# Patient Record
Sex: FEMALE | Race: BLACK OR AFRICAN AMERICAN | Employment: UNEMPLOYED | URBAN - METROPOLITAN AREA
[De-identification: names, ages, dates, MRNs, and addresses within clinical notes are randomized per-mention and may not be internally consistent; named-entity substitution may affect disease eponyms.]

---

## 2017-01-15 ENCOUNTER — HOSPITAL ENCOUNTER (INPATIENT)
Age: 67
LOS: 5 days | Discharge: HOME | DRG: 336 | End: 2017-01-21
Attending: EMERGENCY MEDICINE | Admitting: SURGERY
Payer: MEDICARE

## 2017-01-15 DIAGNOSIS — K43.6 VENTRAL HERNIA WITH OBSTRUCTION: ICD-10-CM

## 2017-01-15 DIAGNOSIS — K42.0 INCARCERATED UMBILICAL HERNIA: Primary | ICD-10-CM

## 2017-01-15 LAB
ABO + RH BLD: NORMAL
ALBUMIN SERPL BCP-MCNC: 3.1 G/DL (ref 3.5–5)
ALBUMIN/GLOB SERPL: 0.8 {RATIO} (ref 1.1–2.2)
ALP SERPL-CCNC: 78 U/L (ref 45–117)
ALT SERPL-CCNC: 17 U/L (ref 12–78)
ANION GAP BLD CALC-SCNC: 8 MMOL/L (ref 5–15)
APTT PPP: 24 SEC (ref 22.1–32.5)
AST SERPL W P-5'-P-CCNC: 13 U/L (ref 15–37)
BASOPHILS # BLD AUTO: 0 K/UL (ref 0–0.1)
BASOPHILS # BLD: 0 % (ref 0–1)
BILIRUB SERPL-MCNC: 0.8 MG/DL (ref 0.2–1)
BLOOD GROUP ANTIBODIES SERPL: NORMAL
BUN SERPL-MCNC: 10 MG/DL (ref 6–20)
BUN/CREAT SERPL: 17 (ref 12–20)
CALCIUM SERPL-MCNC: 8.7 MG/DL (ref 8.5–10.1)
CHLORIDE SERPL-SCNC: 106 MMOL/L (ref 97–108)
CO2 SERPL-SCNC: 25 MMOL/L (ref 21–32)
CREAT SERPL-MCNC: 0.6 MG/DL (ref 0.55–1.02)
EOSINOPHIL # BLD: 0.2 K/UL (ref 0–0.4)
EOSINOPHIL NFR BLD: 2 % (ref 0–7)
ERYTHROCYTE [DISTWIDTH] IN BLOOD BY AUTOMATED COUNT: 13.3 % (ref 11.5–14.5)
GLOBULIN SER CALC-MCNC: 4.1 G/DL (ref 2–4)
GLUCOSE SERPL-MCNC: 83 MG/DL (ref 65–100)
HCT VFR BLD AUTO: 42.2 % (ref 35–47)
HGB BLD-MCNC: 13.5 G/DL (ref 11.5–16)
INR PPP: 1 (ref 0.9–1.1)
LACTATE SERPL-SCNC: 1.1 MMOL/L (ref 0.4–2)
LYMPHOCYTES # BLD AUTO: 32 % (ref 12–49)
LYMPHOCYTES # BLD: 3.3 K/UL (ref 0.8–3.5)
MCH RBC QN AUTO: 31 PG (ref 26–34)
MCHC RBC AUTO-ENTMCNC: 32 G/DL (ref 30–36.5)
MCV RBC AUTO: 96.8 FL (ref 80–99)
MONOCYTES # BLD: 0.7 K/UL (ref 0–1)
MONOCYTES NFR BLD AUTO: 7 % (ref 5–13)
NEUTS SEG # BLD: 6 K/UL (ref 1.8–8)
NEUTS SEG NFR BLD AUTO: 59 % (ref 32–75)
PLATELET # BLD AUTO: 270 K/UL (ref 150–400)
POTASSIUM SERPL-SCNC: 3.3 MMOL/L (ref 3.5–5.1)
PROT SERPL-MCNC: 7.2 G/DL (ref 6.4–8.2)
PROTHROMBIN TIME: 10.3 SEC (ref 9–11.1)
RBC # BLD AUTO: 4.36 M/UL (ref 3.8–5.2)
SODIUM SERPL-SCNC: 139 MMOL/L (ref 136–145)
SPECIMEN EXP DATE BLD: NORMAL
THERAPEUTIC RANGE,PTTT: NORMAL SECS (ref 58–77)
WBC # BLD AUTO: 10.3 K/UL (ref 3.6–11)

## 2017-01-15 PROCEDURE — 36415 COLL VENOUS BLD VENIPUNCTURE: CPT | Performed by: EMERGENCY MEDICINE

## 2017-01-15 PROCEDURE — 93005 ELECTROCARDIOGRAM TRACING: CPT

## 2017-01-15 PROCEDURE — 85730 THROMBOPLASTIN TIME PARTIAL: CPT | Performed by: EMERGENCY MEDICINE

## 2017-01-15 PROCEDURE — 83605 ASSAY OF LACTIC ACID: CPT | Performed by: EMERGENCY MEDICINE

## 2017-01-15 PROCEDURE — 85610 PROTHROMBIN TIME: CPT | Performed by: EMERGENCY MEDICINE

## 2017-01-15 PROCEDURE — 86900 BLOOD TYPING SEROLOGIC ABO: CPT | Performed by: EMERGENCY MEDICINE

## 2017-01-15 PROCEDURE — 99285 EMERGENCY DEPT VISIT HI MDM: CPT

## 2017-01-15 PROCEDURE — 85025 COMPLETE CBC W/AUTO DIFF WBC: CPT | Performed by: EMERGENCY MEDICINE

## 2017-01-15 PROCEDURE — 80053 COMPREHEN METABOLIC PANEL: CPT | Performed by: EMERGENCY MEDICINE

## 2017-01-15 NOTE — IP AVS SNAPSHOT
Current Discharge Medication List  
  
Take these medications at their scheduled times Dose & Instructions Dispensing Information Comments Morning Noon Evening Bedtime  
 docusate sodium 100 mg capsule Commonly known as:  Johann Pata Your next dose is: Today, Tomorrow Other:  ____________ Dose:  100 mg Take 1 Cap by mouth daily. Quantity:  30 Cap Refills:  0  
     
   
   
   
  
 hydrALAZINE 100 mg tablet Commonly known as:  APRESOLINE Your next dose is: Today, Tomorrow Other:  ____________ Dose:  50 mg Take 50 mg by mouth two (2) times a day. Refills:  0  
     
   
   
   
  
 hydroCHLOROthiazide 12.5 mg capsule Commonly known as:  Beck [place] Your next dose is: Today, Tomorrow Other:  ____________ Dose:  12.5 mg Take 12.5 mg by mouth daily. Refills:  0 MICARDIS 80 mg tablet Generic drug:  telmisartan Your next dose is: Today, Tomorrow Other:  ____________ Dose:  80 mg Take 80 mg by mouth daily. Refills:  0 Take these medications as needed Dose & Instructions Dispensing Information Comments Morning Noon Evening Bedtime HYDROmorphone 2 mg tablet Commonly known as:  DILAUDID Your next dose is: Today, Tomorrow Other:  ____________ Dose:  2 mg Take 1 Tab by mouth every four (4) hours as needed. Quantity:  40 Tab Refills:  0 Take these medications as directed Dose & Instructions Dispensing Information Comments Morning Noon Evening Bedtime  
 hydrocortisone 2.5 % topical cream  
Commonly known as:  HYTONE Your next dose is: Today, Tomorrow Other:  ____________ APPLY A THIN LAYER TOPICALLY  TO ABDOMEN  TWICE DAILY AS NEEDED Quantity:  30 oz Refills:  0  
     
   
   
   
  
 TYLENOL PO  
   
 Your next dose is: Today, Tomorrow Other:  ____________ Take  by mouth. Refills:  0 Where to Get Your Medications Information about where to get these medications is not yet available ! Ask your nurse or doctor about these medications  
  docusate sodium 100 mg capsule HYDROmorphone 2 mg tablet

## 2017-01-15 NOTE — IP AVS SNAPSHOT
Summary of Care Report The Summary of Care report has been created to help improve care coordination. Users with access to [email or web address] [address] (Web-based application) may access additional patient information including the Discharge Summary. If you are not currently a [address] user and need more information, please call the number listed below in the [place] [address] section and ask to be connected with Medical Records. Facility Information Name Address Phone [phone] [place] [phone] Patient Information Patient Name Sex  Dimitri Sample ([id number]) Female 1950 Discharge Information Admitting Provider Service Area Unit Leandro Kent MD / [address] [facility] [address] / [phone] Discharge Provider Discharge Date/Time Discharge Disposition Destination (none) 2017 Midday (Pending) AHR (none) Patient Language Language ENGLISH [13] Problem List as of 2017  Date Reviewed: 12/10/2013 Codes Priority Class Noted - Resolved RESOLVED: Small bowel obstruction ([facility] [address] ICD-10-CM: K56.69 
ICD-9-CM: 560.9   12/10/2013 - 2014 Ventral hernia with obstruction ICD-10-CM: K43.6 ICD-9-CM: 552.20   2014 - Present Small bowel obstruction ([facility] [address] ICD-10-CM: K56.69 
ICD-9-CM: 560.9   2017 - Present You are allergic to the following Allergen Reactions Aspirin Other (comments) Patient told not to take due to abdominal surgeries Current Discharge Medication List  
  
CONTINUE these medications which have NOT CHANGED Dose & Instructions Dispensing Information Comments  
 docusate sodium 100 mg capsule Commonly known as:  [place] Dose:  100 mg Take 1 Cap by mouth daily. Quantity:  30 Cap Refills:  0 hydrALAZINE 100 mg tablet Commonly known as:  APRESOLINE Dose:  50 mg Take 50 mg by mouth two (2) times a day. Refills:  0  
   
 hydroCHLOROthiazide 12.5 mg capsule Commonly known as:  [place] Dose:  12.5 mg Take 12.5 mg by mouth daily. Refills:  0  
   
 hydrocortisone 2.5 % topical cream  
Commonly known as:  HYTONE  
 APPLY A THIN LAYER TOPICALLY  TO ABDOMEN  TWICE DAILY AS NEEDED Quantity:  30 oz Refills:  0 HYDROmorphone 2 mg tablet Commonly known as:  DILAUDID Dose:  2 mg Take 1 Tab by mouth every four (4) hours as needed. Quantity:  40 Tab Refills:  0 MICARDIS 80 mg tablet Generic drug:  telmisartan Dose:  80 mg Take 80 mg by mouth daily. Refills:  0  
   
 TYLENOL PO Take  by mouth. Refills:  0 Surgery Information ID Date/Time Status Primary Surgeon All Procedures Location [id number] 1/16/2017 0100 Posted Cali Bradford MD LAPAROTOMY EXPLORATORY AND OPEN VENTRAL HERNIA REPAIR WITH MESH [address] [place] [address] OR Follow-up Information Follow up With Details Comments Contact Info Lucio Osorio MD   Patient can only remember the practice name and not the physician Discharge Instructions 1. Do not drive while on pain medication. You should take a stool softener while on pain medication to prevent constipation. 2.  Do not lift anything greater than 10 pounds for 4 weeks. 3.  You may resume your home medications. 4. You may shower but do not swim or soak in tub for 2 weeks. 5.  Please call [phone] to schedule a follow-up with Dr. Lucero Alvarez within 2 weeks. Abdominal Hernia Repair: What to Expect at Home Your Recovery After surgery to repair your hernia, you are likely to have pain for a few days. You may also feel like you have the flu, and you may have a low fever and feel tired and nauseated. This is common. You should feel better after a few days and will probably feel much better in 7 days. For several weeks you may feel twinges or pulling in the hernia repair when you move. You may have some bruising around the area of your hernia repair. This is normal. 
This care sheet gives you a general idea about how long it will take for you to recover. But each person recovers at a different pace. Follow the steps below to get better as quickly as possible. How can you care for yourself at home? Activity · Rest when you feel tired. Getting enough sleep will help you recover. · Try to walk each day. Start by walking a little more than you did the day before. Bit by bit, increase the amount you walk. Walking boosts blood flow and helps prevent pneumonia and constipation. · If your doctor gives you an abdominal binder to wear, use it as directed. This is an elastic bandage that wraps around your belly and upper hips. It helps support your belly muscles after surgery. · Avoid strenuous activities, such as biking, jogging, weight lifting, or aerobic exercise, until your doctor says it is okay. · Avoid lifting anything that would make you strain. This may include heavy grocery bags and milk containers, a heavy briefcase or backpack, cat litter or dog food bags, a vacuum , or a child. · Ask your doctor when you can drive again. · Most people are able to return to work within 1 to 2 weeks after surgery. But if your job requires that you to do heavy lifting or strenuous activity, you may need to take 4 to 6 weeks off from work. · You may shower 24 to 48 hours after surgery, if your doctor okays it. Pat the cut (incision) dry. Do not take a bath for the first 2 weeks, or until your doctor tells you it is okay. · Ask your doctor when it is okay for you to have sex. Diet · You can eat your normal diet. If your stomach is upset, try bland, low-fat foods like plain rice, broiled chicken, toast, and yogurt. · Drink plenty of fluids (unless your doctor tells you not to). · You may notice that your bowel movements are not regular right after your surgery. This is common. Avoid constipation and straining with bowel movements. You may want to take a fiber supplement every day. If you have not had a bowel movement after a couple of days, ask your doctor about taking a mild laxative. Medicines · Your doctor will tell you if and when you can restart your medicines. He or she will also give you instructions about taking any new medicines. · If you take blood thinners, such as warfarin (Coumadin), clopidogrel (Plavix), or aspirin, be sure to talk to your doctor. He or she will tell you if and when to start taking those medicines again. Make sure that you understand exactly what your doctor wants you to do. · Be safe with medicines. Take pain medicines exactly as directed. ¨ If the doctor gave you a prescription medicine for pain, take it as prescribed. ¨ If you are not taking a prescription pain medicine, ask your doctor if you can take an over-the-counter medicine. · If your doctor prescribed antibiotics, take them as directed. Do not stop taking them just because you feel better. You need to take the full course of antibiotics. · If you think your pain medicine is making you sick to your stomach: 
¨ Take your medicine after meals (unless your doctor has told you not to). ¨ Ask your doctor for a different pain medicine. Incision care · If you have strips of tape on the cut (incision) the doctor made, leave the tape on for a week or until it falls off. Or follow your doctor's instructions for removing the tape. · If you have staples closing the cut, you will need to visit your doctor in 1 to 2 weeks to have them removed. · Wash the area daily with warm, soapy water, and pat it dry. Don't use hydrogen peroxide or alcohol, which can slow healing. You may cover the area with a gauze bandage if it weeps or rubs against clothing. Change the bandage every day. Other instructions · Hold a pillow over your incision when you cough or take deep breaths. This will support your belly and decrease your pain. · Do breathing exercises at home as instructed by your doctor. This will help prevent pneumonia. · If you had laparoscopic surgery, you may also have pain in your left shoulder. The pain usually lasts about a day or two. Follow-up care is a key part of your treatment and safety. Be sure to make and go to all appointments, and call your doctor if you are having problems. It's also a good idea to know your test results and keep a list of the medicines you take. When should you call for help? Call 911 anytime you think you may need emergency care. For example, call if: 
· You passed out (lost consciousness). · You have sudden chest pain and shortness of breath, or you cough up blood. · You have severe pain in your belly. Call your doctor now or seek immediate medical care if: 
· You are sick to your stomach and cannot keep fluids down. · You have signs of a blood clot, such as: 
¨ Pain in your calf, back of the knee, thigh, or groin. ¨ Redness and swelling in your leg or groin. · You have signs of infection, such as: 
¨ Increased pain, swelling, warmth, or redness. ¨ Red streaks leading from the incision. ¨ Pus draining from the incision. ¨ A fever. · You have trouble passing urine or stool, especially if you have mild pain or swelling in your lower belly. · Bright red blood has soaked through the bandage over your incision. Watch closely for changes in your health, and be sure to contact your doctor if: 
· Your swelling is getting worse. · Your swelling is not going down. · You still don't have a bowel movement after taking a laxative. Where can you learn more? Go to http://kerri-mary.info/. Enter B577 in the search box to learn more about \"Abdominal Hernia Repair: What to Expect at Home. \" Current as of: August 9, 2016 Content Version: 11.1 © 4201-7015 Healthwise, Incorporated. Care instructions adapted under license by [employer] (which disclaims liability or warranty for this information). If you have questions about a medical condition or this instruction, always ask your healthcare professional. [place] [address] any warranty or liability for your use of this information. Chart Review Routing History No Routing History on File

## 2017-01-15 NOTE — IP AVS SNAPSHOT
[address] 
[phone] Patient: Geovani Brunson MRN: [id number] CQV:[id number] You are allergic to the following Allergen Reactions Aspirin Other (comments) Patient told not to take due to abdominal surgeries Recent Documentation Height Weight BMI OB Status Smoking Status 1.626 m 149.7 kg 56.64 kg/m2 Postmenopausal Never Smoker Unresulted Labs Order Current Status SAMPLE TO BLOOD BANK In process Emergency Contacts Name Discharge Info Relation Home Work Mobile Sridhar Castano  Spouse [3] [phone] [address] [facility]  Child [2] [phone] About your hospitalization You were admitted on:  January 16, 2017 You last received care in the:  [address] You were discharged on:  January 21, 2017 Unit phone number:  [phone] Why you were hospitalized Your primary diagnosis was:  Not on File Your diagnoses also included:  Small Bowel Obstruction (Hcc) Providers Seen During Your Hospitalizations Provider Role Specialty Primary office phone Clinton Spear MD Attending Provider Emergency Medicine [phone] Traci Denney MD Attending Provider General Surgery [phone] Your Primary Care Physician (PCP) Primary Care Physician Office Phone Office Fax OTHER, PHYS ** None ** ** None ** Follow-up Information Follow up With Details Comments Contact Info Lucio Osorio MD   Patient can only remember the practice name and not the physician Current Discharge Medication List  
  
CONTINUE these medications which have NOT CHANGED Dose & Instructions Dispensing Information Comments Morning Noon Evening Bedtime  
 docusate sodium 100 mg capsule Commonly known as:  Rola Carvalhos Your next dose is: Today, Tomorrow Other:  _________  Dose:  100 mg  
 Take 1 Cap by mouth daily. Quantity:  30 Cap Refills:  0  
     
   
   
   
  
 hydrALAZINE 100 mg tablet Commonly known as:  APRESOLINE Your next dose is: Today, Tomorrow Other:  _________ Dose:  50 mg Take 50 mg by mouth two (2) times a day. Refills:  0  
     
   
   
   
  
 hydroCHLOROthiazide 12.5 mg capsule Commonly known as:  Alvia Boss Your next dose is: Today, Tomorrow Other:  _________ Dose:  12.5 mg Take 12.5 mg by mouth daily. Refills:  0  
     
   
   
   
  
 hydrocortisone 2.5 % topical cream  
Commonly known as:  HYTONE Your next dose is: Today, Tomorrow Other:  _________ APPLY A THIN LAYER TOPICALLY  TO ABDOMEN  TWICE DAILY AS NEEDED Quantity:  30 oz Refills:  0 HYDROmorphone 2 mg tablet Commonly known as:  DILAUDID Your next dose is: Today, Tomorrow Other:  _________ Dose:  2 mg Take 1 Tab by mouth every four (4) hours as needed. Quantity:  40 Tab Refills:  0 MICARDIS 80 mg tablet Generic drug:  telmisartan Your next dose is: Today, Tomorrow Other:  _________ Dose:  80 mg Take 80 mg by mouth daily. Refills:  0  
     
   
   
   
  
 TYLENOL PO Your next dose is: Today, Tomorrow Other:  _________ Take  by mouth. Refills:  0 Where to Get Your Medications Information on where to get these meds will be given to you by the nurse or doctor. ! Ask your nurse or doctor about these medications  
  docusate sodium 100 mg capsule HYDROmorphone 2 mg tablet Discharge Instructions 1. Do not drive while on pain medication. You should take a stool softener while on pain medication to prevent constipation. 2.  Do not lift anything greater than 10 pounds for 4 weeks. 3.  You may resume your home medications. 4. You may shower but do not swim or soak in tub for 2 weeks. 5.  Please call [phone] to schedule a follow-up with Dr. Catalina Holloway within 2 weeks. Abdominal Hernia Repair: What to Expect at Home Your Recovery After surgery to repair your hernia, you are likely to have pain for a few days. You may also feel like you have the flu, and you may have a low fever and feel tired and nauseated. This is common. You should feel better after a few days and will probably feel much better in 7 days. For several weeks you may feel twinges or pulling in the hernia repair when you move. You may have some bruising around the area of your hernia repair. This is normal. 
This care sheet gives you a general idea about how long it will take for you to recover. But each person recovers at a different pace. Follow the steps below to get better as quickly as possible. How can you care for yourself at home? Activity · Rest when you feel tired. Getting enough sleep will help you recover. · Try to walk each day. Start by walking a little more than you did the day before. Bit by bit, increase the amount you walk. Walking boosts blood flow and helps prevent pneumonia and constipation. · If your doctor gives you an abdominal binder to wear, use it as directed. This is an elastic bandage that wraps around your belly and upper hips. It helps support your belly muscles after surgery. · Avoid strenuous activities, such as biking, jogging, weight lifting, or aerobic exercise, until your doctor says it is okay. · Avoid lifting anything that would make you strain. This may include heavy grocery bags and milk containers, a heavy briefcase or backpack, cat litter or dog food bags, a vacuum , or a child. · Ask your doctor when you can drive again. · Most people are able to return to work within 1 to 2 weeks after surgery.  But if your job requires that you to do heavy lifting or strenuous activity, you may need to take 4 to 6 weeks off from work. · You may shower 24 to 48 hours after surgery, if your doctor okays it. Pat the cut (incision) dry. Do not take a bath for the first 2 weeks, or until your doctor tells you it is okay. · Ask your doctor when it is okay for you to have sex. Diet · You can eat your normal diet. If your stomach is upset, try bland, low-fat foods like plain rice, broiled chicken, toast, and yogurt. · Drink plenty of fluids (unless your doctor tells you not to). · You may notice that your bowel movements are not regular right after your surgery. This is common. Avoid constipation and straining with bowel movements. You may want to take a fiber supplement every day. If you have not had a bowel movement after a couple of days, ask your doctor about taking a mild laxative. Medicines · Your doctor will tell you if and when you can restart your medicines. He or she will also give you instructions about taking any new medicines. · If you take blood thinners, such as warfarin (Coumadin), clopidogrel (Plavix), or aspirin, be sure to talk to your doctor. He or she will tell you if and when to start taking those medicines again. Make sure that you understand exactly what your doctor wants you to do. · Be safe with medicines. Take pain medicines exactly as directed. ¨ If the doctor gave you a prescription medicine for pain, take it as prescribed. ¨ If you are not taking a prescription pain medicine, ask your doctor if you can take an over-the-counter medicine. · If your doctor prescribed antibiotics, take them as directed. Do not stop taking them just because you feel better. You need to take the full course of antibiotics. · If you think your pain medicine is making you sick to your stomach: 
¨ Take your medicine after meals (unless your doctor has told you not to). ¨ Ask your doctor for a different pain medicine. Incision care · If you have strips of tape on the cut (incision) the doctor made, leave the tape on for a week or until it falls off. Or follow your doctor's instructions for removing the tape. · If you have staples closing the cut, you will need to visit your doctor in 1 to 2 weeks to have them removed. · Wash the area daily with warm, soapy water, and pat it dry. Don't use hydrogen peroxide or alcohol, which can slow healing. You may cover the area with a gauze bandage if it weeps or rubs against clothing. Change the bandage every day. Other instructions · Hold a pillow over your incision when you cough or take deep breaths. This will support your belly and decrease your pain. · Do breathing exercises at home as instructed by your doctor. This will help prevent pneumonia. · If you had laparoscopic surgery, you may also have pain in your left shoulder. The pain usually lasts about a day or two. Follow-up care is a key part of your treatment and safety. Be sure to make and go to all appointments, and call your doctor if you are having problems. It's also a good idea to know your test results and keep a list of the medicines you take. When should you call for help? Call 911 anytime you think you may need emergency care. For example, call if: 
· You passed out (lost consciousness). · You have sudden chest pain and shortness of breath, or you cough up blood. · You have severe pain in your belly. Call your doctor now or seek immediate medical care if: 
· You are sick to your stomach and cannot keep fluids down. · You have signs of a blood clot, such as: 
¨ Pain in your calf, back of the knee, thigh, or groin. ¨ Redness and swelling in your leg or groin. · You have signs of infection, such as: 
¨ Increased pain, swelling, warmth, or redness. ¨ Red streaks leading from the incision. ¨ Pus draining from the incision. ¨ A fever.  
· You have trouble passing urine or stool, especially if you have mild pain or swelling in your lower belly. · Bright red blood has soaked through the bandage over your incision. Watch closely for changes in your health, and be sure to contact your doctor if: 
· Your swelling is getting worse. · Your swelling is not going down. · You still don't have a bowel movement after taking a laxative. Where can you learn more? Go to http://kerri-mary.info/. Enter B577 in the search box to learn more about \"Abdominal Hernia Repair: What to Expect at Home. \" Current as of: August 9, 2016 Content Version: 11.1 © 5002-9188 [employer]. Care instructions adapted under license by [employer] (which disclaims liability or warranty for this information). If you have questions about a medical condition or this instruction, always ask your healthcare professional. [address] any warranty or liability for your use of this information. Discharge Orders None Introducing [facility]! Barbara Garcia introduces Social[employer] patient portal. Now you can access parts of your medical record, email your doctor's office, and request medication refills online. 1. In your internet browser, go to https://[email or web address]. [employer] 2. Click on the First Time User? Click Here link in the Sign In box. You will see the New Member Sign Up page. 3. Enter your SocialF5 Access Code exactly as it appears below. You will not need to use this code after youve completed the sign-up process. If you do not sign up before the expiration date, you must request a new code. · SocialF5 Access Code: [email or web address] Expires: 4/15/2017 10:45 PM 
 
4. Enter the last four digits of your Social Security Number (xxxx) and Date of Birth (mm/dd/yyyy) as indicated and click Submit. You will be taken to the next sign-up page. 5. Create a SocialF5 ID.  This will be your SocialF5 login ID and cannot be changed, so think of one that is secure and easy to remember. 6. Create a [employer] password. You can change your password at any time. 7. Enter your Password Reset Question and Answer. This can be used at a later time if you forget your password. 8. Enter your e-mail address. You will receive e-mail notification when new information is available in [address] 9. Click Sign Up. You can now view and download portions of your medical record. 10. Click the Download Summary menu link to download a portable copy of your medical information. If you have questions, please visit the Frequently Asked Questions section of the [employer] website. Remember, [employer] is NOT to be used for urgent needs. For medical emergencies, dial 911. Now available from your iPhone and Android! General Information Please provide this summary of care documentation to your next provider. Patient Signature:  ____________________________________________________________ Date:  ____________________________________________________________  
  
[place] Provider Signature:  ____________________________________________________________ Date:  ____________________________________________________________

## 2017-01-16 ENCOUNTER — ANESTHESIA (OUTPATIENT)
Dept: SURGERY | Age: 67
DRG: 336 | End: 2017-01-16
Payer: MEDICARE

## 2017-01-16 ENCOUNTER — ANESTHESIA EVENT (OUTPATIENT)
Dept: SURGERY | Age: 67
DRG: 336 | End: 2017-01-16
Payer: MEDICARE

## 2017-01-16 PROBLEM — K56.609 SMALL BOWEL OBSTRUCTION (HCC): Status: ACTIVE | Noted: 2017-01-16

## 2017-01-16 LAB
ATRIAL RATE: 57 BPM
CALCULATED P AXIS, ECG09: 88 DEGREES
CALCULATED R AXIS, ECG10: -10 DEGREES
CALCULATED T AXIS, ECG11: 27 DEGREES
DIAGNOSIS, 93000: NORMAL
P-R INTERVAL, ECG05: 196 MS
Q-T INTERVAL, ECG07: 418 MS
QRS DURATION, ECG06: 84 MS
QTC CALCULATION (BEZET), ECG08: 406 MS
VENTRICULAR RATE, ECG03: 57 BPM

## 2017-01-16 PROCEDURE — 77030032490 HC SLV COMPR SCD KNE COVD -B: Performed by: SURGERY

## 2017-01-16 PROCEDURE — 0DN80ZZ RELEASE SMALL INTESTINE, OPEN APPROACH: ICD-10-PCS | Performed by: SURGERY

## 2017-01-16 PROCEDURE — 74011250636 HC RX REV CODE- 250/636

## 2017-01-16 PROCEDURE — C1781 MESH (IMPLANTABLE): HCPCS | Performed by: SURGERY

## 2017-01-16 PROCEDURE — 77030013079 HC BLNKT BAIR HGGR 3M -A: Performed by: ANESTHESIOLOGY

## 2017-01-16 PROCEDURE — 77030002895 HC DEV VASC CLOSR COVD -B: Performed by: SURGERY

## 2017-01-16 PROCEDURE — 0WUF0JZ SUPPLEMENT ABDOMINAL WALL WITH SYNTHETIC SUBSTITUTE, OPEN APPROACH: ICD-10-PCS | Performed by: SURGERY

## 2017-01-16 PROCEDURE — 88302 TISSUE EXAM BY PATHOLOGIST: CPT | Performed by: SURGERY

## 2017-01-16 PROCEDURE — 76060000041 HC ANESTHESIA 5 TO 5.5 HR: Performed by: SURGERY

## 2017-01-16 PROCEDURE — 74011000250 HC RX REV CODE- 250: Performed by: SURGERY

## 2017-01-16 PROCEDURE — 77030018846 HC SOL IRR STRL H20 ICUM -A: Performed by: SURGERY

## 2017-01-16 PROCEDURE — 77030008467 HC STPLR SKN COVD -B: Performed by: SURGERY

## 2017-01-16 PROCEDURE — 77030034850: Performed by: SURGERY

## 2017-01-16 PROCEDURE — 74011000258 HC RX REV CODE- 258: Performed by: SURGERY

## 2017-01-16 PROCEDURE — 77030027138 HC INCENT SPIROMETER -A

## 2017-01-16 PROCEDURE — 76010000137 HC OR TIME 5 TO 5.5 HR: Performed by: SURGERY

## 2017-01-16 PROCEDURE — 77030008684 HC TU ET CUF COVD -B: Performed by: ANESTHESIOLOGY

## 2017-01-16 PROCEDURE — 77030031139 HC SUT VCRL2 J&J -A: Performed by: SURGERY

## 2017-01-16 PROCEDURE — 77030020061 HC IV BLD WRMR ADMIN SET 3M -B: Performed by: ANESTHESIOLOGY

## 2017-01-16 PROCEDURE — 77030011640 HC PAD GRND REM COVD -A: Performed by: SURGERY

## 2017-01-16 PROCEDURE — C9113 INJ PANTOPRAZOLE SODIUM, VIA: HCPCS | Performed by: SURGERY

## 2017-01-16 PROCEDURE — 74011250636 HC RX REV CODE- 250/636: Performed by: SURGERY

## 2017-01-16 PROCEDURE — 65210000002 HC RM PRIVATE GYN

## 2017-01-16 PROCEDURE — 77030032435: Performed by: SURGERY

## 2017-01-16 PROCEDURE — 74011000250 HC RX REV CODE- 250

## 2017-01-16 PROCEDURE — 77030026438 HC STYL ET INTUB CARD -A: Performed by: ANESTHESIOLOGY

## 2017-01-16 PROCEDURE — 76210000016 HC OR PH I REC 1 TO 1.5 HR: Performed by: SURGERY

## 2017-01-16 PROCEDURE — 77030002986 HC SUT PROL J&J -A: Performed by: SURGERY

## 2017-01-16 PROCEDURE — 77030008771 HC TU NG SALEM SUMP -A: Performed by: ANESTHESIOLOGY

## 2017-01-16 PROCEDURE — 74011250637 HC RX REV CODE- 250/637: Performed by: SURGERY

## 2017-01-16 PROCEDURE — 77030002966 HC SUT PDS J&J -A: Performed by: SURGERY

## 2017-01-16 PROCEDURE — 77030018836 HC SOL IRR NACL ICUM -A: Performed by: SURGERY

## 2017-01-16 DEVICE — IMPLANTABLE DEVICE: Type: IMPLANTABLE DEVICE | Site: ABDOMEN | Status: FUNCTIONAL

## 2017-01-16 RX ORDER — ONDANSETRON 2 MG/ML
4 INJECTION INTRAMUSCULAR; INTRAVENOUS AS NEEDED
Status: DISCONTINUED | OUTPATIENT
Start: 2017-01-16 | End: 2017-01-16 | Stop reason: HOSPADM

## 2017-01-16 RX ORDER — FENTANYL CITRATE 50 UG/ML
INJECTION, SOLUTION INTRAMUSCULAR; INTRAVENOUS AS NEEDED
Status: DISCONTINUED | OUTPATIENT
Start: 2017-01-16 | End: 2017-01-16 | Stop reason: HOSPADM

## 2017-01-16 RX ORDER — SODIUM CHLORIDE, SODIUM LACTATE, POTASSIUM CHLORIDE, CALCIUM CHLORIDE 600; 310; 30; 20 MG/100ML; MG/100ML; MG/100ML; MG/100ML
125 INJECTION, SOLUTION INTRAVENOUS CONTINUOUS
Status: DISCONTINUED | OUTPATIENT
Start: 2017-01-16 | End: 2017-01-16 | Stop reason: HOSPADM

## 2017-01-16 RX ORDER — NEOSTIGMINE METHYLSULFATE 1 MG/ML
INJECTION INTRAVENOUS AS NEEDED
Status: DISCONTINUED | OUTPATIENT
Start: 2017-01-16 | End: 2017-01-16 | Stop reason: HOSPADM

## 2017-01-16 RX ORDER — MORPHINE SULFATE 10 MG/ML
2 INJECTION, SOLUTION INTRAMUSCULAR; INTRAVENOUS
Status: DISCONTINUED | OUTPATIENT
Start: 2017-01-16 | End: 2017-01-16 | Stop reason: HOSPADM

## 2017-01-16 RX ORDER — SODIUM CHLORIDE, SODIUM LACTATE, POTASSIUM CHLORIDE, CALCIUM CHLORIDE 600; 310; 30; 20 MG/100ML; MG/100ML; MG/100ML; MG/100ML
INJECTION, SOLUTION INTRAVENOUS
Status: DISCONTINUED | OUTPATIENT
Start: 2017-01-16 | End: 2017-01-16 | Stop reason: HOSPADM

## 2017-01-16 RX ORDER — HYDROMORPHONE HYDROCHLORIDE 2 MG/ML
INJECTION, SOLUTION INTRAMUSCULAR; INTRAVENOUS; SUBCUTANEOUS AS NEEDED
Status: DISCONTINUED | OUTPATIENT
Start: 2017-01-16 | End: 2017-01-16 | Stop reason: HOSPADM

## 2017-01-16 RX ORDER — MIDAZOLAM HYDROCHLORIDE 1 MG/ML
0.5 INJECTION, SOLUTION INTRAMUSCULAR; INTRAVENOUS
Status: DISCONTINUED | OUTPATIENT
Start: 2017-01-16 | End: 2017-01-16 | Stop reason: HOSPADM

## 2017-01-16 RX ORDER — ROCURONIUM BROMIDE 10 MG/ML
INJECTION, SOLUTION INTRAVENOUS AS NEEDED
Status: DISCONTINUED | OUTPATIENT
Start: 2017-01-16 | End: 2017-01-16 | Stop reason: HOSPADM

## 2017-01-16 RX ORDER — HYDROMORPHONE HCL IN 0.9% NACL 15 MG/30ML
PATIENT CONTROLLED ANALGESIA VIAL INTRAVENOUS
Status: COMPLETED
Start: 2017-01-16 | End: 2017-01-16

## 2017-01-16 RX ORDER — DOCUSATE SODIUM 100 MG/1
100 CAPSULE, LIQUID FILLED ORAL 2 TIMES DAILY
Status: DISCONTINUED | OUTPATIENT
Start: 2017-01-16 | End: 2017-01-21 | Stop reason: HOSPADM

## 2017-01-16 RX ORDER — PROCHLORPERAZINE EDISYLATE 5 MG/ML
5 INJECTION INTRAMUSCULAR; INTRAVENOUS
Status: DISCONTINUED | OUTPATIENT
Start: 2017-01-16 | End: 2017-01-16 | Stop reason: CLARIF

## 2017-01-16 RX ORDER — SODIUM CHLORIDE, SODIUM LACTATE, POTASSIUM CHLORIDE, CALCIUM CHLORIDE 600; 310; 30; 20 MG/100ML; MG/100ML; MG/100ML; MG/100ML
50 INJECTION, SOLUTION INTRAVENOUS CONTINUOUS
Status: DISCONTINUED | OUTPATIENT
Start: 2017-01-16 | End: 2017-01-21 | Stop reason: HOSPADM

## 2017-01-16 RX ORDER — HYDROMORPHONE HCL IN 0.9% NACL 15 MG/30ML
PATIENT CONTROLLED ANALGESIA VIAL INTRAVENOUS CONTINUOUS
Status: DISCONTINUED | OUTPATIENT
Start: 2017-01-16 | End: 2017-01-19

## 2017-01-16 RX ORDER — SODIUM CHLORIDE, SODIUM LACTATE, POTASSIUM CHLORIDE, CALCIUM CHLORIDE 600; 310; 30; 20 MG/100ML; MG/100ML; MG/100ML; MG/100ML
75 INJECTION, SOLUTION INTRAVENOUS CONTINUOUS
Status: DISCONTINUED | OUTPATIENT
Start: 2017-01-16 | End: 2017-01-16 | Stop reason: HOSPADM

## 2017-01-16 RX ORDER — MIDAZOLAM HYDROCHLORIDE 1 MG/ML
INJECTION, SOLUTION INTRAMUSCULAR; INTRAVENOUS AS NEEDED
Status: DISCONTINUED | OUTPATIENT
Start: 2017-01-16 | End: 2017-01-16 | Stop reason: HOSPADM

## 2017-01-16 RX ORDER — PANTOPRAZOLE SODIUM 40 MG/10ML
INJECTION, POWDER, LYOPHILIZED, FOR SOLUTION INTRAVENOUS
Status: DISPENSED
Start: 2017-01-16 | End: 2017-01-16

## 2017-01-16 RX ORDER — SODIUM CHLORIDE 9 MG/ML
25 INJECTION, SOLUTION INTRAVENOUS CONTINUOUS
Status: DISCONTINUED | OUTPATIENT
Start: 2017-01-16 | End: 2017-01-16 | Stop reason: HOSPADM

## 2017-01-16 RX ORDER — KETAMINE HYDROCHLORIDE 10 MG/ML
INJECTION, SOLUTION INTRAMUSCULAR; INTRAVENOUS AS NEEDED
Status: DISCONTINUED | OUTPATIENT
Start: 2017-01-16 | End: 2017-01-16 | Stop reason: HOSPADM

## 2017-01-16 RX ORDER — SODIUM CHLORIDE 0.9 % (FLUSH) 0.9 %
5-10 SYRINGE (ML) INJECTION AS NEEDED
Status: DISCONTINUED | OUTPATIENT
Start: 2017-01-16 | End: 2017-01-16 | Stop reason: HOSPADM

## 2017-01-16 RX ORDER — HYDROCODONE BITARTRATE AND ACETAMINOPHEN 5; 325 MG/1; MG/1
1 TABLET ORAL AS NEEDED
Status: DISCONTINUED | OUTPATIENT
Start: 2017-01-16 | End: 2017-01-16 | Stop reason: HOSPADM

## 2017-01-16 RX ORDER — PANTOPRAZOLE SODIUM 40 MG/10ML
40 INJECTION, POWDER, LYOPHILIZED, FOR SOLUTION INTRAVENOUS EVERY 24 HOURS
Status: DISCONTINUED | OUTPATIENT
Start: 2017-01-16 | End: 2017-01-16 | Stop reason: CLARIF

## 2017-01-16 RX ORDER — SODIUM CHLORIDE 9 MG/ML
50 INJECTION, SOLUTION INTRAVENOUS CONTINUOUS
Status: DISCONTINUED | OUTPATIENT
Start: 2017-01-16 | End: 2017-01-16 | Stop reason: HOSPADM

## 2017-01-16 RX ORDER — HYDROMORPHONE HYDROCHLORIDE 1 MG/ML
0.2 INJECTION, SOLUTION INTRAMUSCULAR; INTRAVENOUS; SUBCUTANEOUS
Status: DISCONTINUED | OUTPATIENT
Start: 2017-01-16 | End: 2017-01-16 | Stop reason: HOSPADM

## 2017-01-16 RX ORDER — MIDAZOLAM HYDROCHLORIDE 1 MG/ML
1 INJECTION, SOLUTION INTRAMUSCULAR; INTRAVENOUS AS NEEDED
Status: DISCONTINUED | OUTPATIENT
Start: 2017-01-16 | End: 2017-01-16 | Stop reason: HOSPADM

## 2017-01-16 RX ORDER — PROPOFOL 10 MG/ML
INJECTION, EMULSION INTRAVENOUS AS NEEDED
Status: DISCONTINUED | OUTPATIENT
Start: 2017-01-16 | End: 2017-01-16 | Stop reason: HOSPADM

## 2017-01-16 RX ORDER — PIPERACILLIN SODIUM, TAZOBACTAM SODIUM 3; .375 G/15ML; G/15ML
INJECTION, POWDER, LYOPHILIZED, FOR SOLUTION INTRAVENOUS
Status: COMPLETED
Start: 2017-01-16 | End: 2017-01-16

## 2017-01-16 RX ORDER — SODIUM CHLORIDE 900 MG/100ML
INJECTION INTRAVENOUS
Status: DISPENSED
Start: 2017-01-16 | End: 2017-01-16

## 2017-01-16 RX ORDER — ONDANSETRON 2 MG/ML
INJECTION INTRAMUSCULAR; INTRAVENOUS AS NEEDED
Status: DISCONTINUED | OUTPATIENT
Start: 2017-01-16 | End: 2017-01-16 | Stop reason: HOSPADM

## 2017-01-16 RX ORDER — ACETAMINOPHEN 325 MG/1
650 TABLET ORAL
Status: DISCONTINUED | OUTPATIENT
Start: 2017-01-16 | End: 2017-01-21 | Stop reason: HOSPADM

## 2017-01-16 RX ORDER — HYDROCHLOROTHIAZIDE 25 MG/1
12.5 TABLET ORAL DAILY
Status: DISCONTINUED | OUTPATIENT
Start: 2017-01-16 | End: 2017-01-21 | Stop reason: HOSPADM

## 2017-01-16 RX ORDER — HYDRALAZINE HYDROCHLORIDE 25 MG/1
50 TABLET, FILM COATED ORAL 2 TIMES DAILY
Status: DISCONTINUED | OUTPATIENT
Start: 2017-01-16 | End: 2017-01-19

## 2017-01-16 RX ORDER — ONDANSETRON 2 MG/ML
4 INJECTION INTRAMUSCULAR; INTRAVENOUS
Status: DISCONTINUED | OUTPATIENT
Start: 2017-01-16 | End: 2017-01-21 | Stop reason: HOSPADM

## 2017-01-16 RX ORDER — SUCCINYLCHOLINE CHLORIDE 20 MG/ML
INJECTION INTRAMUSCULAR; INTRAVENOUS AS NEEDED
Status: DISCONTINUED | OUTPATIENT
Start: 2017-01-16 | End: 2017-01-16 | Stop reason: HOSPADM

## 2017-01-16 RX ORDER — DIPHENHYDRAMINE HYDROCHLORIDE 50 MG/ML
12.5 INJECTION, SOLUTION INTRAMUSCULAR; INTRAVENOUS AS NEEDED
Status: DISCONTINUED | OUTPATIENT
Start: 2017-01-16 | End: 2017-01-16 | Stop reason: HOSPADM

## 2017-01-16 RX ORDER — FENTANYL CITRATE 50 UG/ML
25 INJECTION, SOLUTION INTRAMUSCULAR; INTRAVENOUS
Status: DISCONTINUED | OUTPATIENT
Start: 2017-01-16 | End: 2017-01-16 | Stop reason: HOSPADM

## 2017-01-16 RX ORDER — FENTANYL CITRATE 50 UG/ML
50 INJECTION, SOLUTION INTRAMUSCULAR; INTRAVENOUS AS NEEDED
Status: DISCONTINUED | OUTPATIENT
Start: 2017-01-16 | End: 2017-01-16 | Stop reason: HOSPADM

## 2017-01-16 RX ORDER — LIDOCAINE HYDROCHLORIDE 20 MG/ML
INJECTION, SOLUTION EPIDURAL; INFILTRATION; INTRACAUDAL; PERINEURAL AS NEEDED
Status: DISCONTINUED | OUTPATIENT
Start: 2017-01-16 | End: 2017-01-16 | Stop reason: HOSPADM

## 2017-01-16 RX ORDER — HYDROMORPHONE HYDROCHLORIDE 1 MG/ML
1 INJECTION, SOLUTION INTRAMUSCULAR; INTRAVENOUS; SUBCUTANEOUS
Status: DISCONTINUED | OUTPATIENT
Start: 2017-01-16 | End: 2017-01-21 | Stop reason: HOSPADM

## 2017-01-16 RX ORDER — TELMISARTAN 80 MG/1
80 TABLET ORAL DAILY
Status: DISCONTINUED | OUTPATIENT
Start: 2017-01-16 | End: 2017-01-16 | Stop reason: CLARIF

## 2017-01-16 RX ORDER — LOSARTAN POTASSIUM 50 MG/1
100 TABLET ORAL DAILY
Status: DISCONTINUED | OUTPATIENT
Start: 2017-01-16 | End: 2017-01-21 | Stop reason: HOSPADM

## 2017-01-16 RX ORDER — LIDOCAINE HYDROCHLORIDE 10 MG/ML
0.1 INJECTION, SOLUTION EPIDURAL; INFILTRATION; INTRACAUDAL; PERINEURAL AS NEEDED
Status: DISCONTINUED | OUTPATIENT
Start: 2017-01-16 | End: 2017-01-16 | Stop reason: HOSPADM

## 2017-01-16 RX ORDER — GLYCOPYRROLATE 0.2 MG/ML
INJECTION INTRAMUSCULAR; INTRAVENOUS AS NEEDED
Status: DISCONTINUED | OUTPATIENT
Start: 2017-01-16 | End: 2017-01-16 | Stop reason: HOSPADM

## 2017-01-16 RX ORDER — SODIUM CHLORIDE 0.9 % (FLUSH) 0.9 %
5-10 SYRINGE (ML) INJECTION EVERY 8 HOURS
Status: DISCONTINUED | OUTPATIENT
Start: 2017-01-16 | End: 2017-01-16 | Stop reason: HOSPADM

## 2017-01-16 RX ADMIN — PROPOFOL 160 MG: 10 INJECTION, EMULSION INTRAVENOUS at 01:01

## 2017-01-16 RX ADMIN — SODIUM CHLORIDE 40 MG: 9 INJECTION INTRAMUSCULAR; INTRAVENOUS; SUBCUTANEOUS at 07:13

## 2017-01-16 RX ADMIN — GLYCOPYRROLATE 0.6 MG: 0.2 INJECTION INTRAMUSCULAR; INTRAVENOUS at 05:54

## 2017-01-16 RX ADMIN — HYDROMORPHONE HYDROCHLORIDE 0.5 MG: 2 INJECTION, SOLUTION INTRAMUSCULAR; INTRAVENOUS; SUBCUTANEOUS at 05:56

## 2017-01-16 RX ADMIN — HYDROMORPHONE HYDROCHLORIDE 0.4 MG: 2 INJECTION, SOLUTION INTRAMUSCULAR; INTRAVENOUS; SUBCUTANEOUS at 06:21

## 2017-01-16 RX ADMIN — ROCURONIUM BROMIDE 35 MG: 10 INJECTION, SOLUTION INTRAVENOUS at 01:11

## 2017-01-16 RX ADMIN — ROCURONIUM BROMIDE 10 MG: 10 INJECTION, SOLUTION INTRAVENOUS at 03:30

## 2017-01-16 RX ADMIN — KETAMINE HYDROCHLORIDE 10 MG: 10 INJECTION, SOLUTION INTRAMUSCULAR; INTRAVENOUS at 04:44

## 2017-01-16 RX ADMIN — PROPOFOL 60 MG: 10 INJECTION, EMULSION INTRAVENOUS at 05:24

## 2017-01-16 RX ADMIN — Medication: at 07:26

## 2017-01-16 RX ADMIN — SODIUM CHLORIDE, SODIUM LACTATE, POTASSIUM CHLORIDE, CALCIUM CHLORIDE: 600; 310; 30; 20 INJECTION, SOLUTION INTRAVENOUS at 02:30

## 2017-01-16 RX ADMIN — SUCCINYLCHOLINE CHLORIDE 200 MG: 20 INJECTION INTRAMUSCULAR; INTRAVENOUS at 01:01

## 2017-01-16 RX ADMIN — HYDROMORPHONE HYDROCHLORIDE 0.8 MG: 2 INJECTION, SOLUTION INTRAMUSCULAR; INTRAVENOUS; SUBCUTANEOUS at 00:50

## 2017-01-16 RX ADMIN — HYDROMORPHONE HYDROCHLORIDE 0.4 MG: 2 INJECTION, SOLUTION INTRAMUSCULAR; INTRAVENOUS; SUBCUTANEOUS at 04:08

## 2017-01-16 RX ADMIN — ROCURONIUM BROMIDE 10 MG: 10 INJECTION, SOLUTION INTRAVENOUS at 02:07

## 2017-01-16 RX ADMIN — KETAMINE HYDROCHLORIDE 10 MG: 10 INJECTION, SOLUTION INTRAMUSCULAR; INTRAVENOUS at 03:30

## 2017-01-16 RX ADMIN — PIPERACILLIN SODIUM,TAZOBACTAM SODIUM 3.38 G: 3; .375 INJECTION, POWDER, FOR SOLUTION INTRAVENOUS at 07:19

## 2017-01-16 RX ADMIN — KETAMINE HYDROCHLORIDE 10 MG: 10 INJECTION, SOLUTION INTRAMUSCULAR; INTRAVENOUS at 01:57

## 2017-01-16 RX ADMIN — PROPOFOL 40 MG: 10 INJECTION, EMULSION INTRAVENOUS at 04:07

## 2017-01-16 RX ADMIN — HYDRALAZINE HYDROCHLORIDE 50 MG: 25 TABLET, FILM COATED ORAL at 18:22

## 2017-01-16 RX ADMIN — SODIUM CHLORIDE, SODIUM LACTATE, POTASSIUM CHLORIDE, AND CALCIUM CHLORIDE 125 ML/HR: 600; 310; 30; 20 INJECTION, SOLUTION INTRAVENOUS at 18:22

## 2017-01-16 RX ADMIN — FENTANYL CITRATE 100 MCG: 50 INJECTION, SOLUTION INTRAMUSCULAR; INTRAVENOUS at 01:01

## 2017-01-16 RX ADMIN — ROCURONIUM BROMIDE 10 MG: 10 INJECTION, SOLUTION INTRAVENOUS at 04:08

## 2017-01-16 RX ADMIN — HYDROMORPHONE HYDROCHLORIDE 0.4 MG: 2 INJECTION, SOLUTION INTRAMUSCULAR; INTRAVENOUS; SUBCUTANEOUS at 02:05

## 2017-01-16 RX ADMIN — LIDOCAINE HYDROCHLORIDE 60 MG: 20 INJECTION, SOLUTION EPIDURAL; INFILTRATION; INTRACAUDAL; PERINEURAL at 01:01

## 2017-01-16 RX ADMIN — MIDAZOLAM HYDROCHLORIDE 2 MG: 1 INJECTION, SOLUTION INTRAMUSCULAR; INTRAVENOUS at 00:50

## 2017-01-16 RX ADMIN — ROCURONIUM BROMIDE 10 MG: 10 INJECTION, SOLUTION INTRAVENOUS at 04:57

## 2017-01-16 RX ADMIN — SODIUM CHLORIDE, SODIUM LACTATE, POTASSIUM CHLORIDE, CALCIUM CHLORIDE: 600; 310; 30; 20 INJECTION, SOLUTION INTRAVENOUS at 05:26

## 2017-01-16 RX ADMIN — ROCURONIUM BROMIDE 5 MG: 10 INJECTION, SOLUTION INTRAVENOUS at 01:01

## 2017-01-16 RX ADMIN — HYDROMORPHONE HYDROCHLORIDE 0.5 MG: 2 INJECTION, SOLUTION INTRAMUSCULAR; INTRAVENOUS; SUBCUTANEOUS at 06:00

## 2017-01-16 RX ADMIN — ONDANSETRON 4 MG: 2 INJECTION INTRAMUSCULAR; INTRAVENOUS at 05:16

## 2017-01-16 RX ADMIN — PIPERACILLIN SODIUM,TAZOBACTAM SODIUM 3.38 G: 3; .375 INJECTION, POWDER, FOR SOLUTION INTRAVENOUS at 22:37

## 2017-01-16 RX ADMIN — NEOSTIGMINE METHYLSULFATE 3 MG: 1 INJECTION INTRAVENOUS at 05:54

## 2017-01-16 RX ADMIN — ROCURONIUM BROMIDE 10 MG: 10 INJECTION, SOLUTION INTRAVENOUS at 03:00

## 2017-01-16 RX ADMIN — KETAMINE HYDROCHLORIDE 30 MG: 10 INJECTION, SOLUTION INTRAMUSCULAR; INTRAVENOUS at 01:00

## 2017-01-16 RX ADMIN — PIPERACILLIN SODIUM,TAZOBACTAM SODIUM 3.38 G: 3; .375 INJECTION, POWDER, FOR SOLUTION INTRAVENOUS at 01:10

## 2017-01-16 RX ADMIN — DOCUSATE SODIUM 100 MG: 100 CAPSULE, LIQUID FILLED ORAL at 18:22

## 2017-01-16 RX ADMIN — SODIUM CHLORIDE, SODIUM LACTATE, POTASSIUM CHLORIDE, CALCIUM CHLORIDE: 600; 310; 30; 20 INJECTION, SOLUTION INTRAVENOUS at 00:50

## 2017-01-16 RX ADMIN — HYDROMORPHONE HYDROCHLORIDE 0.4 MG: 2 INJECTION, SOLUTION INTRAMUSCULAR; INTRAVENOUS; SUBCUTANEOUS at 05:23

## 2017-01-16 RX ADMIN — PROPOFOL 40 MG: 10 INJECTION, EMULSION INTRAVENOUS at 05:44

## 2017-01-16 RX ADMIN — GUAIFENESIN 600 MG: 600 TABLET, EXTENDED RELEASE ORAL at 22:37

## 2017-01-16 RX ADMIN — SODIUM CHLORIDE, SODIUM LACTATE, POTASSIUM CHLORIDE, AND CALCIUM CHLORIDE 125 ML/HR: 600; 310; 30; 20 INJECTION, SOLUTION INTRAVENOUS at 10:10

## 2017-01-16 RX ADMIN — KETAMINE HYDROCHLORIDE 10 MG: 10 INJECTION, SOLUTION INTRAMUSCULAR; INTRAVENOUS at 02:42

## 2017-01-16 RX ADMIN — ROCURONIUM BROMIDE 10 MG: 10 INJECTION, SOLUTION INTRAVENOUS at 02:02

## 2017-01-16 NOTE — PROGRESS NOTES
Progress Note    Patient: Miguel Villegas MRN: [id number]  SSN: [id number]    YOB: 1950  Age: 61 y.o. Sex: female      Admit Date: 1/15/2017    Day of Surgery    Procedure:  Procedure(s):  LAPAROTOMY EXPLORATORY AND OPEN VENTRAL HERNIA REPAIR WITH MESH    Subjective:     No acute surgical issues. Pt reported some abdominal tenderness. Pt also reported some sorethroat - most likely from intubation.     Objective:     Visit Vitals    /75 (BP 1 Location: Right arm, BP Patient Position: At rest)    Pulse 78    Temp 98.6 °F (37 °C)    Resp 18    Ht 5' 4\" (1.626 m)    Wt 330 lb (149.7 kg)    SpO2 96%    BMI 56.64 kg/m2       Temp (24hrs), Av.8 °F (37.1 °C), Min:97.9 °F (36.6 °C), Max:99.9 °F (37.7 °C)        Physical Exam:    Gen:  NAD  Pulm:  Unlabored  Abd:  S/ND/appropriate TTP  Wound:  C/D/I    Recent Results (from the past 24 hour(s))   EKG, 12 LEAD, INITIAL    Collection Time: 01/15/17 10:28 PM   Result Value Ref Range    Ventricular Rate 57 BPM    Atrial Rate 57 BPM    P-R Interval 196 ms    QRS Duration 84 ms    Q-T Interval 418 ms    QTC Calculation (Bezet) 406 ms    Calculated P Axis 88 degrees    Calculated R Axis -10 degrees    Calculated T Axis 27 degrees    Diagnosis       Sinus bradycardia with premature atrial complexes  Nonspecific T wave abnormality  When compared with ECG of 16-MAY-2014 15:06,  premature atrial complexes are now present    Confirmed by Kandis Dey M.D., Margo Saha ([id number]) on 2017 7:16:06 AM     CBC WITH AUTOMATED DIFF    Collection Time: 01/15/17 10:30 PM   Result Value Ref Range    WBC 10.3 3.6 - 11.0 K/uL    RBC 4.36 3.80 - 5.20 M/uL    HGB 13.5 11.5 - 16.0 g/dL    HCT 42.2 35.0 - 47.0 %    MCV 96.8 80.0 - 99.0 FL    MCH 31.0 26.0 - 34.0 PG    MCHC 32.0 30.0 - 36.5 g/dL    RDW 13.3 11.5 - 14.5 %    PLATELET 695 698 - 338 K/uL    NEUTROPHILS 59 32 - 75 %    LYMPHOCYTES 32 12 - 49 %    MONOCYTES 7 5 - 13 %    EOSINOPHILS 2 0 - 7 %    BASOPHILS 0 0 - 1 %    ABS. NEUTROPHILS 6.0 1.8 - 8.0 K/UL    ABS. LYMPHOCYTES 3.3 0.8 - 3.5 K/UL    ABS. MONOCYTES 0.7 0.0 - 1.0 K/UL    ABS. EOSINOPHILS 0.2 0.0 - 0.4 K/UL    ABS. BASOPHILS 0.0 0.0 - 0.1 K/UL   METABOLIC PANEL, COMPREHENSIVE    Collection Time: 01/15/17 10:30 PM   Result Value Ref Range    Sodium 139 136 - 145 mmol/L    Potassium 3.3 (L) 3.5 - 5.1 mmol/L    Chloride 106 97 - 108 mmol/L    CO2 25 21 - 32 mmol/L    Anion gap 8 5 - 15 mmol/L    Glucose 83 65 - 100 mg/dL    BUN 10 6 - 20 MG/DL    Creatinine 0.60 0.55 - 1.02 MG/DL    BUN/Creatinine ratio 17 12 - 20      GFR est AA >60 >60 ml/min/1.73m2    GFR est non-AA >60 >60 ml/min/1.73m2    Calcium 8.7 8.5 - 10.1 MG/DL    Bilirubin, total 0.8 0.2 - 1.0 MG/DL    ALT 17 12 - 78 U/L    AST 13 (L) 15 - 37 U/L    Alk.  phosphatase 78 45 - 117 U/L    Protein, total 7.2 6.4 - 8.2 g/dL    Albumin 3.1 (L) 3.5 - 5.0 g/dL    Globulin 4.1 (H) 2.0 - 4.0 g/dL    A-G Ratio 0.8 (L) 1.1 - 2.2     PROTHROMBIN TIME + INR    Collection Time: 01/15/17 10:30 PM   Result Value Ref Range    INR 1.0 0.9 - 1.1      Prothrombin time 10.3 9.0 - 11.1 sec   PTT    Collection Time: 01/15/17 10:30 PM   Result Value Ref Range    aPTT 24.0 22.1 - 32.5 sec    aPTT, therapeutic range     58.0 - 77.0 SECS   TYPE & SCREEN    Collection Time: 01/15/17 10:30 PM   Result Value Ref Range    Crossmatch Expiration 01/18/2017     ABO/Rh(D) A POSITIVE     Antibody screen NEG    LACTIC ACID, PLASMA    Collection Time: 01/15/17 10:48 PM   Result Value Ref Range    Lactic acid 1.1 0.4 - 2.0 MMOL/L         Assessment:     Hospital Problems  Date Reviewed: 12/10/2013          Codes Class Noted POA    Small bowel obstruction ([facility] [address] ICD-10-CM: K56.69  ICD-9-CM: 560.9  1/16/2017 Unknown              Plan/Recommendations/Medical Decision Making:     - NPO with sips of clears  - Abdominal binder  - Continue PCA  - Labs in am  - Mucinex for expecterant    Signed By: Aleksandr Ruiz MD     January 16, 2017

## 2017-01-16 NOTE — BRIEF OP NOTE
BRIEF OPERATIVE NOTE    Date of Procedure: 1/16/2017   Preoperative Diagnosis: INCARCERATED VENTRAL HERNIA  Postoperative Diagnosis: INCARCERATED VENTRAL HERNIA    Procedure(s):  LAPAROTOMY EXPLORATORY AND OPEN VENTRAL HERNIA REPAIR WITH MESH  Surgeon(s) and Role:     * Katarzyna Wilder MD - Primary            Surgical Staff:  Circ-1: Kamran Lemus RN  Scrub Tech-1: Dusty Fernandes  Surg Asst-1: Tayler Navarrete  Event Time In   Incision Start 0129   Incision Close 0558     Anesthesia: General   Estimated Blood Loss: Minimal  Specimens:   ID Type Source Tests Collected by Time Destination   1 : Hernia sac and mesh Fresh Abdomen  Katarzyna Wilder MD 1/16/2017 0334 Pathology      Findings: There were several herniae in the lower midline incision. There was a 4 cm hernia to left of umbilicus with small bowel causing incarceration and bowel obstruction. There was no evidence of bowel ischemia or necrosis. Complications: None  Implants:   Implant Name Type Inv.  Item Serial No.  Lot No. LRB No. Used Action   MESH OPEN SKIRT [id number] -- [employer] - [id number]   MESH OPEN SKIRT [id number] -- [employer]   [facility] [id number] N/A 1 Implanted

## 2017-01-16 NOTE — ED NOTES
Bedside shift change report given to [id number] Judy Styles RN (oncoming nurse) by Sandrine Whatley RN (offgoing nurse). Report included the following information SBAR, ED Summary, Intake/Output, MAR and Recent Results.

## 2017-01-16 NOTE — ED NOTES
Patient taken to OR. Patient instructed on CHG bath, but was picked up by OR before she was able to complete. Consent signed.

## 2017-01-16 NOTE — PERIOP NOTES
Received patient from OR via bed to ,VSS, in no distress. Abdomen with Midline incision ,Abdominal binder. HALLEY from midline abdomin. Dr. Golden Watson at bedside. Family updated,visited patient and went home,left patient personal CPAP with patient.

## 2017-01-16 NOTE — H&P
Chief Complaint:  Incarcerated ventral hernia causing small bowel obstruction    HPI:  60 yo woman with hx obstructive sleep apnea, incisional hernia repair, HTN and morbid obesity who was transferred from [place] for incarcerated incisional hernia with bowel obstruction. Pt reported waking up at 2:00 am with abdominal pain, nausea and vomiting. Pt did have BM this morning. Pain has been to left of umbilicus and pt reported intermittent crampy pain. Pt had CT scan performed which showed small bowel obstruction secondary to incisional hernia. Past Medical History   Diagnosis Date    Arthritis     Endocrine disease      Thyroid nodule    Hypertension     Ill-defined condition      Obesity    Obesity     MELQUIADES on CPAP        Past Surgical History   Procedure Laterality Date    Hx orthopaedic       RTKR    Hx gyn       tubal ligation    Hx other surgical  12-12-13     PSBO with ventral hernia repair by [facility]. [facility]    Hx hernia repair  5-19-14     open incisional hernia repair with underlay mesh and extensive  lysis of adhesions for 2 hours - [facility]- DR. Izquierdo       No current facility-administered medications on file prior to encounter. Current Outpatient Prescriptions on File Prior to Encounter   Medication Sig Dispense Refill    hydrocortisone (HYTONE) 2.5 % topical cream APPLY A THIN LAYER TOPICALLY  TO ABDOMEN  TWICE DAILY AS NEEDED 30 oz 0    ACETAMINOPHEN (TYLENOL PO) Take  by mouth.  docusate sodium (COLACE) 100 mg capsule Take 1 Cap by mouth daily. 30 Cap 2    HYDROmorphone (DILAUDID) 2 mg tablet Take 1 Tab by mouth every four (4) hours as needed. 40 Tab 0    telmisartan (MICARDIS) 80 mg tablet Take 80 mg by mouth daily.  hydrochlorothiazide (MICROZIDE) 12.5 mg capsule Take 12.5 mg by mouth daily.  hydrALAZINE (APRESOLINE) 100 mg tablet Take 50 mg by mouth two (2) times a day.          Allergies   Allergen Reactions    Aspirin Other (comments)     Patient told not to take due to abdominal surgeries         Review of Systems - General ROS: negative  Psychological ROS: negative  Respiratory ROS: negative  Cardiovascular ROS: negative  Gastrointestinal ROS: positive for - abdominal pain and nausea/vomiting    Visit Vitals    /51    Pulse (!) 58    Temp 98.1 °F (36.7 °C)    Resp 20    Ht 5' 4\" (1.626 m)    Wt 330 lb (149.7 kg)    SpO2 95%    BMI 56.64 kg/m2         Physical Exam:    Gen:  NAD  Pulm:  Unlabored  Abd:  S/moderately distended/moderate TTP at periumbilical area with unreducible hernia    Recent Results (from the past 24 hour(s))   EKG, 12 LEAD, INITIAL    Collection Time: 01/15/17 10:28 PM   Result Value Ref Range    Ventricular Rate 57 BPM    Atrial Rate 57 BPM    P-R Interval 196 ms    QRS Duration 84 ms    Q-T Interval 418 ms    QTC Calculation (Bezet) 406 ms    Calculated P Axis 88 degrees    Calculated R Axis -10 degrees    Calculated T Axis 27 degrees    Diagnosis       Sinus bradycardia with premature atrial complexes  Nonspecific T wave abnormality  When compared with ECG of 16-MAY-2014 15:06,  premature atrial complexes are now present  Nonspecific T wave abnormality now evident in Inferior leads  Nonspecific T wave abnormality, worse in Lateral leads     CBC WITH AUTOMATED DIFF    Collection Time: 01/15/17 10:30 PM   Result Value Ref Range    WBC 10.3 3.6 - 11.0 K/uL    RBC 4.36 3.80 - 5.20 M/uL    HGB 13.5 11.5 - 16.0 g/dL    HCT 42.2 35.0 - 47.0 %    MCV 96.8 80.0 - 99.0 FL    MCH 31.0 26.0 - 34.0 PG    MCHC 32.0 30.0 - 36.5 g/dL    RDW 13.3 11.5 - 14.5 %    PLATELET 410 228 - 774 K/uL    NEUTROPHILS 59 32 - 75 %    LYMPHOCYTES 32 12 - 49 %    MONOCYTES 7 5 - 13 %    EOSINOPHILS 2 0 - 7 %    BASOPHILS 0 0 - 1 %    ABS. NEUTROPHILS 6.0 1.8 - 8.0 K/UL    ABS. LYMPHOCYTES 3.3 0.8 - 3.5 K/UL    ABS. MONOCYTES 0.7 0.0 - 1.0 K/UL    ABS. EOSINOPHILS 0.2 0.0 - 0.4 K/UL    ABS.  BASOPHILS 0.0 0.0 - 0.1 K/UL   METABOLIC PANEL, COMPREHENSIVE Collection Time: 01/15/17 10:30 PM   Result Value Ref Range    Sodium 139 136 - 145 mmol/L    Potassium 3.3 (L) 3.5 - 5.1 mmol/L    Chloride 106 97 - 108 mmol/L    CO2 25 21 - 32 mmol/L    Anion gap 8 5 - 15 mmol/L    Glucose 83 65 - 100 mg/dL    BUN 10 6 - 20 MG/DL    Creatinine 0.60 0.55 - 1.02 MG/DL    BUN/Creatinine ratio 17 12 - 20      GFR est AA >60 >60 ml/min/1.73m2    GFR est non-AA >60 >60 ml/min/1.73m2    Calcium 8.7 8.5 - 10.1 MG/DL    Bilirubin, total 0.8 0.2 - 1.0 MG/DL    ALT 17 12 - 78 U/L    AST 13 (L) 15 - 37 U/L    Alk. phosphatase 78 45 - 117 U/L    Protein, total 7.2 6.4 - 8.2 g/dL    Albumin 3.1 (L) 3.5 - 5.0 g/dL    Globulin 4.1 (H) 2.0 - 4.0 g/dL    A-G Ratio 0.8 (L) 1.1 - 2.2     PROTHROMBIN TIME + INR    Collection Time: 01/15/17 10:30 PM   Result Value Ref Range    INR 1.0 0.9 - 1.1      Prothrombin time 10.3 9.0 - 11.1 sec   PTT    Collection Time: 01/15/17 10:30 PM   Result Value Ref Range    aPTT 24.0 22.1 - 32.5 sec    aPTT, therapeutic range     58.0 - 77.0 SECS   TYPE & SCREEN    Collection Time: 01/15/17 10:30 PM   Result Value Ref Range    Crossmatch Expiration 01/18/2017     ABO/Rh(D) A POSITIVE     Antibody screen NEG    LACTIC ACID, PLASMA    Collection Time: 01/15/17 10:48 PM   Result Value Ref Range    Lactic acid 1.1 0.4 - 2.0 MMOL/L       AP:  60 yo woman with incarcerated incisional hernia    - Incarcerated hernia:  To OR emergently for open incisional hernia repair with mesh  - risks and benefits explained. Consent obtained.   Proceed to OR

## 2017-01-16 NOTE — ANESTHESIA PREPROCEDURE EVALUATION
Anesthetic History   No history of anesthetic complications            Review of Systems / Medical History  Patient summary reviewed, nursing notes reviewed and pertinent labs reviewed    Pulmonary  Within defined limits      Sleep apnea           Neuro/Psych   Within defined limits           Cardiovascular  Within defined limits  Hypertension                   GI/Hepatic/Renal  Within defined limits              Endo/Other  Within defined limits      Obesity     Other Findings              Physical Exam    Airway  Mallampati: II  TM Distance: > 6 cm  Neck ROM: normal range of motion   Mouth opening: Normal     Cardiovascular  Regular rate and rhythm,  S1 and S2 normal,  no murmur, click, rub, or gallop             Dental  No notable dental hx       Pulmonary  Breath sounds clear to auscultation               Abdominal  GI exam deferred       Other Findings            Anesthetic Plan    ASA: 2  Anesthesia type: general          Induction: Intravenous  Anesthetic plan and risks discussed with: Patient

## 2017-01-16 NOTE — PROGRESS NOTES
Care Management Interventions  PCP Verified by CM: Yes (Dr. Sherryle Jamaica)  Mode of Transport at Discharge: Other (see comment) (private vehicle)  Discharge Durable Medical Equipment: No (Has Maico Prasad, and Bedside Commode at home)  Physical Therapy Consult: No  Occupational Therapy Consult: No  Speech Therapy Consult: No  Current Support Network: Lives with Spouse  Confirm Follow Up Transport: Family  Plan discussed with Pt/Family/Caregiver: No  Freedom of Choice Offered: Yes    CM reviewed chart. Pt lives with her  in [facility]. Pt's daughter lives in the [address] [place] [address] and is very supportive of patient. Pt has stayed with daughter after previous hospital stays and received home health services from [address] [place] [address]. Pt does have a cane, walker, and bedside commode at home. Pt receives Disability. Pt does have a PCP in [facility] - Dr. Sherryle Jamaica. Plan is to return home with family at time of discharge. CM will continue to follow for any needs that may arise.   Selin Smith, LUDIVINAW, ACM

## 2017-01-16 NOTE — ED PROVIDER NOTES
HPI Comments: 61 y.o. female with past medical history significant for obesity, HTN, PSBO, open incisional hernia repair who presents via EMS from ED in [place] with chief complaint of abdominal pain. Pt arrives as transfer from a [place] ER for SBO and incarcerated bowel. Pt states that she wanted to come to [facility] in [place], [place] because she has had previous abdominal surgery done here. Pt reports onset of constant, mid abdominal pain 20 hours ago (0200), in addition to nausea and vomiting. Pt states that she went to a hospital in [place], had pain and nausea medicine with relief and was transported to [facility] for further eval and possible surgery. She is uncertain if her abdomen is distended. Pt denies hx of heart murmur. There are no other acute medical concerns at this time. PCP: None    Note written by Berry Molina, as dictated by Eleazar Zheng MD 10:25 PM      The history is provided by the patient and the EMS personnel. Past Medical History:   Diagnosis Date    Arthritis     Endocrine disease      Thyroid nodule    Hypertension     Ill-defined condition      Obesity    Obesity     MELQUIADES on CPAP        Past Surgical History:   Procedure Laterality Date    Hx orthopaedic       RTKR    Hx gyn       tubal ligation    Hx other surgical  12-12-13     PSBO with ventral hernia repair by [facility]. [facility]    Hx hernia repair  5-19-14     open incisional hernia repair with underlay mesh and extensive  lysis of adhesions for 2 hours - [facility]- DR. Izquierdo         History reviewed. No pertinent family history. Social History     Social History    Marital status:      Spouse name: N/A    Number of children: N/A    Years of education: N/A     Occupational History    Not on file.      Social History Main Topics    Smoking status: Never Smoker    Smokeless tobacco: Never Used    Alcohol use No    Drug use: Not on file    Sexual activity: Not on file     Other Topics Concern    Not on file     Social History Narrative         ALLERGIES: Aspirin    Review of Systems   Gastrointestinal: Positive for abdominal pain, nausea and vomiting. All other systems reviewed and are negative. Vitals:    01/15/17 2216   BP: 156/70   Pulse: 63   Resp: 14   Temp: 97.9 °F (36.6 °C)   SpO2: 98%   Weight: 149.7 kg (330 lb)   Height: 5' 4\" (1.626 m)            Physical Exam   Constitutional: She appears well-developed and well-nourished. HENT:   Head: Normocephalic and atraumatic. Mouth/Throat: Oropharynx is clear and moist.   Eyes: EOM are normal. Pupils are equal, round, and reactive to light. Neck: Normal range of motion. Neck supple. Cardiovascular: Regular rhythm and intact distal pulses. Bradycardia present. Exam reveals no gallop and no friction rub. Murmur heard. Systolic murmur is present with a grade of 2/6   Pulse rate 55   Pulmonary/Chest: Effort normal. No respiratory distress. She has no wheezes. She has no rales. Abdominal: Soft. She exhibits mass (tender mass at left umbilicus). There is no tenderness. There is no rebound. Musculoskeletal: Normal range of motion. She exhibits no tenderness. Neurological: She is alert. No cranial nerve deficit. Motor; symmetric   Skin: No erythema. Psychiatric: She has a normal mood and affect. Her behavior is normal.   Nursing note and vitals reviewed. Note written by Berry Blanca, as dictated by Marvin Amor MD 10:25 PM      [facility]  ED Course       Procedures      PROGRESS NOTE:  10:31 PM  Will consult general surgery. Pt has medical records present with her from the [facility]. Dr. Edna Fonseca is aware of the pt. CONSULT NOTE:  10:35 PM Marvin Amor MD spoke with Dr. Marylene Gross, Consult for Surgery. He will evaluate the pt in the ED. ED EKG interpretation:  Rhythm: sinus bradycardia; and regular .  Rate (approx.): 55; Axis: normal; P wave: normal; QRS interval: normal ; ST/T wave: non-specific changes; in  Lead: ; Other findings: . This EKG was interpreted by Clitnon Spear MD,ED Provider.  10:37 PM

## 2017-01-16 NOTE — PERIOP NOTES
Darlin James Case was interviewed in pre-operative holding. She verbalized consent and understanding of the procedure. She verified her allergies and stated that she has metal implants in her left knee. She verified her npo status as prior to midnight 01/16/2017. She was educated on the intraoperative process and given the opportunity to ask questions, none unresolved.

## 2017-01-16 NOTE — PERIOP NOTES
TRANSFER - OUT REPORT:    Verbal report given to Shari(name) on Cherie Chino  being transferred to [facility](unit) for routine post - op       Report consisted of patients Situation, Background, Assessment and   Recommendations(SBAR). Time Pre op antibiotic given:0110  Anesthesia Stop time: 8997    Information from the following report(s) SBAR, Kardex, Procedure Summary, Intake/Output and MAR was reviewed with the receiving nurse. Opportunity for questions and clarification was provided. Is the patient on 02? YES       L/Min 2L       Other IV    Is the patient on a monitor? NO    Is the nurse transporting with the patient? NO    Surgical Waiting Area notified of patient's transfer from PACU?  YES      The following personal items collected during your admission accompanied patient upon transfer:   Dental Appliance: Dental Appliances: NoneNONE  Vision: Visual Aid:  (Readers)  Hearing Aid:    Jewelry:    Clothing: Clothing: None  Other Valuables:    Valuables sent to safe:

## 2017-01-16 NOTE — ED TRIAGE NOTES
Patient arrived via EMS as a transfer from [address] [place] [address]. Patient was diagnosed with a small bowel obstruction at the hospital in [place], but patient had previous hernia and abdominal surgeries performed by Dr. Morgan Holloway. Patient reports mid lower abdominal pain. Patient denies any nausea. Patient received Zosyn prior to discharge from previous hospital and it was completed en route. Patient had 4mg of morphine and 1L of NS.

## 2017-01-17 LAB
ALBUMIN SERPL BCP-MCNC: 2.5 G/DL (ref 3.5–5)
ALBUMIN/GLOB SERPL: 0.6 {RATIO} (ref 1.1–2.2)
ALP SERPL-CCNC: 65 U/L (ref 45–117)
ALT SERPL-CCNC: 13 U/L (ref 12–78)
ANION GAP BLD CALC-SCNC: 8 MMOL/L (ref 5–15)
AST SERPL W P-5'-P-CCNC: 11 U/L (ref 15–37)
BILIRUB SERPL-MCNC: 1.1 MG/DL (ref 0.2–1)
BUN SERPL-MCNC: 8 MG/DL (ref 6–20)
BUN/CREAT SERPL: 11 (ref 12–20)
CALCIUM SERPL-MCNC: 8.4 MG/DL (ref 8.5–10.1)
CHLORIDE SERPL-SCNC: 107 MMOL/L (ref 97–108)
CO2 SERPL-SCNC: 27 MMOL/L (ref 21–32)
CREAT SERPL-MCNC: 0.71 MG/DL (ref 0.55–1.02)
ERYTHROCYTE [DISTWIDTH] IN BLOOD BY AUTOMATED COUNT: 13.7 % (ref 11.5–14.5)
GLOBULIN SER CALC-MCNC: 4 G/DL (ref 2–4)
GLUCOSE SERPL-MCNC: 110 MG/DL (ref 65–100)
HCT VFR BLD AUTO: 39.7 % (ref 35–47)
HGB BLD-MCNC: 12.4 G/DL (ref 11.5–16)
MCH RBC QN AUTO: 30.8 PG (ref 26–34)
MCHC RBC AUTO-ENTMCNC: 31.2 G/DL (ref 30–36.5)
MCV RBC AUTO: 98.5 FL (ref 80–99)
PLATELET # BLD AUTO: 225 K/UL (ref 150–400)
POTASSIUM SERPL-SCNC: 3.5 MMOL/L (ref 3.5–5.1)
PROT SERPL-MCNC: 6.5 G/DL (ref 6.4–8.2)
RBC # BLD AUTO: 4.03 M/UL (ref 3.8–5.2)
SODIUM SERPL-SCNC: 142 MMOL/L (ref 136–145)
WBC # BLD AUTO: 12.6 K/UL (ref 3.6–11)

## 2017-01-17 PROCEDURE — 74011000250 HC RX REV CODE- 250: Performed by: SURGERY

## 2017-01-17 PROCEDURE — 80053 COMPREHEN METABOLIC PANEL: CPT | Performed by: SURGERY

## 2017-01-17 PROCEDURE — 85027 COMPLETE CBC AUTOMATED: CPT | Performed by: SURGERY

## 2017-01-17 PROCEDURE — 74011250637 HC RX REV CODE- 250/637: Performed by: SURGERY

## 2017-01-17 PROCEDURE — 36415 COLL VENOUS BLD VENIPUNCTURE: CPT | Performed by: SURGERY

## 2017-01-17 PROCEDURE — 65210000002 HC RM PRIVATE GYN

## 2017-01-17 PROCEDURE — 74011000258 HC RX REV CODE- 258: Performed by: SURGERY

## 2017-01-17 PROCEDURE — C9113 INJ PANTOPRAZOLE SODIUM, VIA: HCPCS | Performed by: SURGERY

## 2017-01-17 PROCEDURE — 74011250636 HC RX REV CODE- 250/636: Performed by: NURSE PRACTITIONER

## 2017-01-17 PROCEDURE — 74011250636 HC RX REV CODE- 250/636: Performed by: SURGERY

## 2017-01-17 RX ORDER — METOCLOPRAMIDE HYDROCHLORIDE 5 MG/ML
5 INJECTION INTRAMUSCULAR; INTRAVENOUS EVERY 6 HOURS
Status: DISCONTINUED | OUTPATIENT
Start: 2017-01-17 | End: 2017-01-18

## 2017-01-17 RX ORDER — ENOXAPARIN SODIUM 100 MG/ML
40 INJECTION SUBCUTANEOUS EVERY 24 HOURS
Status: DISCONTINUED | OUTPATIENT
Start: 2017-01-17 | End: 2017-01-21 | Stop reason: HOSPADM

## 2017-01-17 RX ADMIN — HYDRALAZINE HYDROCHLORIDE 50 MG: 25 TABLET, FILM COATED ORAL at 20:02

## 2017-01-17 RX ADMIN — GUAIFENESIN 600 MG: 600 TABLET, EXTENDED RELEASE ORAL at 22:11

## 2017-01-17 RX ADMIN — PIPERACILLIN SODIUM,TAZOBACTAM SODIUM 3.38 G: 3; .375 INJECTION, POWDER, FOR SOLUTION INTRAVENOUS at 06:58

## 2017-01-17 RX ADMIN — LOSARTAN POTASSIUM 100 MG: 50 TABLET ORAL at 10:46

## 2017-01-17 RX ADMIN — DOCUSATE SODIUM 100 MG: 100 CAPSULE, LIQUID FILLED ORAL at 20:03

## 2017-01-17 RX ADMIN — PIPERACILLIN SODIUM,TAZOBACTAM SODIUM 3.38 G: 3; .375 INJECTION, POWDER, FOR SOLUTION INTRAVENOUS at 15:26

## 2017-01-17 RX ADMIN — PIPERACILLIN SODIUM,TAZOBACTAM SODIUM 3.38 G: 3; .375 INJECTION, POWDER, FOR SOLUTION INTRAVENOUS at 22:12

## 2017-01-17 RX ADMIN — GUAIFENESIN 600 MG: 600 TABLET, EXTENDED RELEASE ORAL at 10:47

## 2017-01-17 RX ADMIN — METOCLOPRAMIDE 5 MG: 5 INJECTION, SOLUTION INTRAMUSCULAR; INTRAVENOUS at 20:03

## 2017-01-17 RX ADMIN — METOCLOPRAMIDE 5 MG: 5 INJECTION, SOLUTION INTRAMUSCULAR; INTRAVENOUS at 23:35

## 2017-01-17 RX ADMIN — METOCLOPRAMIDE 5 MG: 5 INJECTION, SOLUTION INTRAMUSCULAR; INTRAVENOUS at 12:48

## 2017-01-17 RX ADMIN — SODIUM CHLORIDE 40 MG: 9 INJECTION INTRAMUSCULAR; INTRAVENOUS; SUBCUTANEOUS at 01:22

## 2017-01-17 RX ADMIN — HYDROCHLOROTHIAZIDE 12.5 MG: 25 TABLET ORAL at 10:46

## 2017-01-17 RX ADMIN — DOCUSATE SODIUM 100 MG: 100 CAPSULE, LIQUID FILLED ORAL at 10:47

## 2017-01-17 RX ADMIN — ENOXAPARIN SODIUM 40 MG: 40 INJECTION SUBCUTANEOUS at 10:47

## 2017-01-17 RX ADMIN — HYDRALAZINE HYDROCHLORIDE 50 MG: 25 TABLET, FILM COATED ORAL at 10:47

## 2017-01-17 NOTE — PROGRESS NOTES
General Surgery Daily Progress Note    Admit Date: 1/15/2017  Post-Operative Day: 1 Day Post-Op from Procedure(s):  LAPAROTOMY EXPLORATORY AND OPEN VENTRAL HERNIA REPAIR WITH MESH     Subjective:     Last 24 hrs: Pt feels well freddy after getting a bath. Pain is controlled w/ PCA. No n/v  Belching, no flatus. Has been OOB to BR. Running low grade fevers. Objective:     Blood pressure 141/72, pulse 77, temperature 99 °F (37.2 °C), resp. rate 16, height 5' 4\" (1.626 m), weight 330 lb (149.7 kg), SpO2 97 %. Temp (24hrs), Av.6 °F (37.6 °C), Min:98.6 °F (37 °C), Max:100.7 °F (38.2 °C)      _____________________  Physical Exam:     Alert and Oriented, x3, in no acute distress. Cardiovascular: RRR, no peripheral edema  Lungs:CTAB anteriorally  Abdomen: soft, +BS, drsg dry, binder on      Assessment:   Active Problems:    Small bowel obstruction (HCC) (2017)            Plan:     Sips of clears  OOB and use IS - encouraged this  Cont PPI  Start lovenox  Cont abx  Am labs    Data Review:    Recent Labs      17   0446  01/15/17   2230   WBC  12.6*  10.3   HGB  12.4  13.5   HCT  39.7  42.2   PLT  225  270     Recent Labs      17   0446  01/15/17   2230   NA  142  139   K  3.5  3.3*   CL  107  106   CO2  27  25   GLU  110*  83   BUN  8  10   CREA  0.71  0.60   CA  8.4*  8.7   ALB  2.5*  3.1*   SGOT  11*  13*   ALT  13  17   INR   --   1.0     No results for input(s): AML, LPSE in the last 72 hours.         ______________________  Medications:    Current Facility-Administered Medications   Medication Dose Route Frequency    HYDROmorphone (PF) (DILAUDID) injection 1 mg  1 mg IntraVENous Q3H PRN    lactated ringers infusion  125 mL/hr IntraVENous CONTINUOUS    ondansetron (ZOFRAN) injection 4 mg  4 mg IntraVENous Q4H PRN    prochlorperazine (COMPAZINE) with saline injection 5 mg  5 mg IntraVENous Q6H PRN    pantoprazole (PROTONIX) 40 mg in sodium chloride 0.9 % 10 mL injection  40 mg IntraVENous Q24H  docusate sodium (COLACE) capsule 100 mg  100 mg Oral BID    hydrALAZINE (APRESOLINE) tablet 50 mg  50 mg Oral BID    hydroCHLOROthiazide (HYDRODIURIL) tablet 12.5 mg  12.5 mg Oral DAILY    HYDROmorphone (PF) 15 mg/30 ml (DILAUDID) PCA   IntraVENous CONTINUOUS    acetaminophen (TYLENOL) tablet 650 mg  650 mg Oral Q6H PRN    piperacillin-tazobactam (ZOSYN) 3.375 g in 0.9% sodium chloride (MBP/ADV) 100 mL  3.375 g IntraVENous Q8H    losartan (COZAAR) tablet 100 mg  100 mg Oral DAILY    guaiFENesin SR (MUCINEX) tablet 600 mg  600 mg Oral Q12H    phenol throat spray (CHLORASEPTIC) 1 Spray  1 Spray Oral PRN         ADDENDUM:  Semaj Davidson MD    Pt seen and examined. No acute surgical issues. Pain is under control. No nausea or vomiting. Continue sips of clears for now. Bowel regiment. Awaiting return of bowel function. Continue PCA.

## 2017-01-18 LAB
ERYTHROCYTE [DISTWIDTH] IN BLOOD BY AUTOMATED COUNT: 13.7 % (ref 11.5–14.5)
HCT VFR BLD AUTO: 37.7 % (ref 35–47)
HGB BLD-MCNC: 11.7 G/DL (ref 11.5–16)
MCH RBC QN AUTO: 30.6 PG (ref 26–34)
MCHC RBC AUTO-ENTMCNC: 31 G/DL (ref 30–36.5)
MCV RBC AUTO: 98.7 FL (ref 80–99)
PLATELET # BLD AUTO: 232 K/UL (ref 150–400)
RBC # BLD AUTO: 3.82 M/UL (ref 3.8–5.2)
WBC # BLD AUTO: 13.2 K/UL (ref 3.6–11)

## 2017-01-18 PROCEDURE — 74011250636 HC RX REV CODE- 250/636: Performed by: SURGERY

## 2017-01-18 PROCEDURE — 74011000250 HC RX REV CODE- 250: Performed by: SURGERY

## 2017-01-18 PROCEDURE — 77010033678 HC OXYGEN DAILY

## 2017-01-18 PROCEDURE — C9113 INJ PANTOPRAZOLE SODIUM, VIA: HCPCS | Performed by: SURGERY

## 2017-01-18 PROCEDURE — 36415 COLL VENOUS BLD VENIPUNCTURE: CPT | Performed by: NURSE PRACTITIONER

## 2017-01-18 PROCEDURE — 65210000002 HC RM PRIVATE GYN

## 2017-01-18 PROCEDURE — 74011250636 HC RX REV CODE- 250/636: Performed by: NURSE PRACTITIONER

## 2017-01-18 PROCEDURE — 74011000258 HC RX REV CODE- 258: Performed by: SURGERY

## 2017-01-18 PROCEDURE — 36591 DRAW BLOOD OFF VENOUS DEVICE: CPT

## 2017-01-18 PROCEDURE — 74011250637 HC RX REV CODE- 250/637: Performed by: SURGERY

## 2017-01-18 PROCEDURE — 85027 COMPLETE CBC AUTOMATED: CPT | Performed by: NURSE PRACTITIONER

## 2017-01-18 RX ORDER — DEXTROMETHORPHAN/PSEUDOEPHED 2.5-7.5/.8
40 DROPS ORAL
Status: DISCONTINUED | OUTPATIENT
Start: 2017-01-18 | End: 2017-01-21 | Stop reason: HOSPADM

## 2017-01-18 RX ORDER — METOCLOPRAMIDE HYDROCHLORIDE 5 MG/ML
10 INJECTION INTRAMUSCULAR; INTRAVENOUS EVERY 6 HOURS
Status: DISCONTINUED | OUTPATIENT
Start: 2017-01-18 | End: 2017-01-20

## 2017-01-18 RX ADMIN — PIPERACILLIN SODIUM,TAZOBACTAM SODIUM 3.38 G: 3; .375 INJECTION, POWDER, FOR SOLUTION INTRAVENOUS at 12:51

## 2017-01-18 RX ADMIN — HYDROCHLOROTHIAZIDE 12.5 MG: 25 TABLET ORAL at 08:42

## 2017-01-18 RX ADMIN — ENOXAPARIN SODIUM 40 MG: 40 INJECTION SUBCUTANEOUS at 10:52

## 2017-01-18 RX ADMIN — SODIUM CHLORIDE 40 MG: 9 INJECTION INTRAMUSCULAR; INTRAVENOUS; SUBCUTANEOUS at 06:21

## 2017-01-18 RX ADMIN — HYDRALAZINE HYDROCHLORIDE 50 MG: 25 TABLET, FILM COATED ORAL at 18:13

## 2017-01-18 RX ADMIN — METOCLOPRAMIDE 5 MG: 5 INJECTION, SOLUTION INTRAMUSCULAR; INTRAVENOUS at 10:52

## 2017-01-18 RX ADMIN — LOSARTAN POTASSIUM 100 MG: 50 TABLET ORAL at 08:43

## 2017-01-18 RX ADMIN — SODIUM CHLORIDE 5 MG: 9 INJECTION INTRAMUSCULAR; INTRAVENOUS; SUBCUTANEOUS at 05:00

## 2017-01-18 RX ADMIN — DOCUSATE SODIUM 100 MG: 100 CAPSULE, LIQUID FILLED ORAL at 18:12

## 2017-01-18 RX ADMIN — DOCUSATE SODIUM 100 MG: 100 CAPSULE, LIQUID FILLED ORAL at 08:43

## 2017-01-18 RX ADMIN — METOCLOPRAMIDE 10 MG: 5 INJECTION, SOLUTION INTRAMUSCULAR; INTRAVENOUS at 18:13

## 2017-01-18 RX ADMIN — GUAIFENESIN 600 MG: 600 TABLET, EXTENDED RELEASE ORAL at 21:50

## 2017-01-18 RX ADMIN — GUAIFENESIN 600 MG: 600 TABLET, EXTENDED RELEASE ORAL at 08:43

## 2017-01-18 RX ADMIN — SODIUM CHLORIDE 5 MG: 9 INJECTION INTRAMUSCULAR; INTRAVENOUS; SUBCUTANEOUS at 14:20

## 2017-01-18 RX ADMIN — PIPERACILLIN SODIUM,TAZOBACTAM SODIUM 3.38 G: 3; .375 INJECTION, POWDER, FOR SOLUTION INTRAVENOUS at 06:16

## 2017-01-18 RX ADMIN — HYDRALAZINE HYDROCHLORIDE 50 MG: 25 TABLET, FILM COATED ORAL at 08:42

## 2017-01-18 RX ADMIN — METOCLOPRAMIDE 5 MG: 5 INJECTION, SOLUTION INTRAMUSCULAR; INTRAVENOUS at 06:16

## 2017-01-18 RX ADMIN — PIPERACILLIN SODIUM,TAZOBACTAM SODIUM 3.38 G: 3; .375 INJECTION, POWDER, FOR SOLUTION INTRAVENOUS at 21:50

## 2017-01-18 RX ADMIN — SODIUM CHLORIDE, SODIUM LACTATE, POTASSIUM CHLORIDE, AND CALCIUM CHLORIDE 125 ML/HR: 600; 310; 30; 20 INJECTION, SOLUTION INTRAVENOUS at 20:38

## 2017-01-18 NOTE — ANESTHESIA POSTPROCEDURE EVALUATION
Post-Anesthesia Evaluation and Assessment    Patient: Sharda Rolle MRN: [id number]  SSN: [id number]    YOB: 1950  Age: 61 y.o. Sex: female       Cardiovascular Function/Vital Signs  Visit Vitals    /77 (BP 1 Location: Right arm, BP Patient Position: At rest)    Pulse 87    Temp 37.1 °C (98.7 °F)    Resp 17    Ht 5' 4\" (1.626 m)    Wt 149.7 kg (330 lb)    SpO2 97%    BMI 56.64 kg/m2       Patient is status post general anesthesia for Procedure(s):  LAPAROTOMY EXPLORATORY AND OPEN VENTRAL HERNIA REPAIR WITH MESH. Nausea/Vomiting: None    Postoperative hydration reviewed and adequate. Pain:  Pain Scale 1: Numeric (0 - 10) (01/18/17 0946)  Pain Intensity 1: 0 (01/18/17 0946)   Managed    Neurological Status:   Neuro (WDL): Within Defined Limits (01/16/17 0745)   At baseline    Mental Status and Level of Consciousness: Arousable    Pulmonary Status:   O2 Device: Nasal cannula (01/18/17 0946)   Adequate oxygenation and airway patent    Complications related to anesthesia: None    Post-anesthesia assessment completed.  No concerns    Signed By: Cesar Hull MD     January 18, 2017

## 2017-01-18 NOTE — PROGRESS NOTES
Daily Progress Note  [facility] General Surgery at [address] Date: 1/15/2017  Post-Operative Day: 2 from Procedure(s):  LAPAROTOMY EXPLORATORY AND OPEN VENTRAL HERNIA REPAIR WITH MESH     Subjective:     Last 24 hrs: Pain adequately controlled with PCA. Getting OOB. No flatus yet, + belching. Requesting cranberry juice. WBC up slightly, continues on zosyn. T max 99.2. Bilirubin up slightly, LFTs stable and WNL. Objective:     Blood pressure 135/77, pulse 87, temperature 98.7 °F (37.1 °C), resp. rate 17, height 5' 4\" (1.626 m), weight 149.7 kg (330 lb), SpO2 97 %. Temp (24hrs), Av.9 °F (37.2 °C), Min:98.7 °F (37.1 °C), Max:99.2 °F (37.3 °C)      _____________________  Physical Exam:     Alert and Oriented, up in chair, in no acute distress. Cardiovascular: RRR  Lungs:CTAB   Abdomen: + BS, soft, obese, appropriately tender. Took down dressing, wound is healing nicely, no erythema or induration. Some dried ss drainage on dressing. HALLEY with ss output, 40 mL out yesterday. Dressing replaced with abdominal binder overtop. Assessment:   Active Problems:    Small bowel obstruction (HCC) (2017)    s/p ex laparotomy and open ventral hernia repair with mesh.          Plan:     May have small amounts of clears  Continue PCA for now  Labs in am  Continue zosyn  OOB  Await return of bowel function  GI/DVT prophylaxis        Clari Kessler, ACNP - [address] [facility] Surgery at [facility] [address],  [place], [address], [place]  [phone]    Data Review:    Recent Labs      17   0229  17   0446  01/15/17   2230   WBC  13.2*  12.6*  10.3   HGB  11.7  12.4  13.5   HCT  37.7  39.7  42.2   PLT  232  225  270     Recent Labs      17   0446  01/15/17   2230   NA  142  139   K  3.5  3.3*   CL  107  106   CO2  27  25   GLU  110*  83   BUN  8  10   CREA  0.71  0.60   CA  8.4*  8.7   ALB  2.5*  3.1*   TBILI  1.1*  0.8   SGOT  11*  13*   ALT  13  17   INR   --   1.0 No results for input(s): AML, LPSE in the last 72 hours. ______________________  Medications:    Current Facility-Administered Medications   Medication Dose Route Frequency    enoxaparin (LOVENOX) injection 40 mg  40 mg SubCUTAneous Q24H    metoclopramide HCl (REGLAN) injection 5 mg  5 mg IntraVENous Q6H    HYDROmorphone (PF) (DILAUDID) injection 1 mg  1 mg IntraVENous Q3H PRN    lactated ringers infusion  125 mL/hr IntraVENous CONTINUOUS    ondansetron (ZOFRAN) injection 4 mg  4 mg IntraVENous Q4H PRN    prochlorperazine (COMPAZINE) with saline injection 5 mg  5 mg IntraVENous Q6H PRN    pantoprazole (PROTONIX) 40 mg in sodium chloride 0.9 % 10 mL injection  40 mg IntraVENous Q24H    docusate sodium (COLACE) capsule 100 mg  100 mg Oral BID    hydrALAZINE (APRESOLINE) tablet 50 mg  50 mg Oral BID    hydroCHLOROthiazide (HYDRODIURIL) tablet 12.5 mg  12.5 mg Oral DAILY    HYDROmorphone (PF) 15 mg/30 ml (DILAUDID) PCA   IntraVENous CONTINUOUS    acetaminophen (TYLENOL) tablet 650 mg  650 mg Oral Q6H PRN    piperacillin-tazobactam (ZOSYN) 3.375 g in 0.9% sodium chloride (MBP/ADV) 100 mL  3.375 g IntraVENous Q8H    losartan (COZAAR) tablet 100 mg  100 mg Oral DAILY    guaiFENesin SR (MUCINEX) tablet 600 mg  600 mg Oral Q12H    phenol throat spray (CHLORASEPTIC) 1 Spray  1 Spray Oral PRN       ADDENDUM:  Harika Alonzo MD    Pt seen and examined. No acute surgical issues. WBC trending up a bit. No flatus or BM yet. NPO with sips. Continue antibiotic. Bowel regiment.

## 2017-01-19 ENCOUNTER — APPOINTMENT (OUTPATIENT)
Dept: GENERAL RADIOLOGY | Age: 67
DRG: 336 | End: 2017-01-19
Attending: SURGERY
Payer: MEDICARE

## 2017-01-19 LAB
ALBUMIN SERPL BCP-MCNC: 2.3 G/DL (ref 3.5–5)
ALBUMIN/GLOB SERPL: 0.5 {RATIO} (ref 1.1–2.2)
ALP SERPL-CCNC: 57 U/L (ref 45–117)
ALT SERPL-CCNC: 13 U/L (ref 12–78)
ANION GAP BLD CALC-SCNC: 7 MMOL/L (ref 5–15)
AST SERPL W P-5'-P-CCNC: 10 U/L (ref 15–37)
BILIRUB SERPL-MCNC: 0.7 MG/DL (ref 0.2–1)
BUN SERPL-MCNC: 8 MG/DL (ref 6–20)
BUN/CREAT SERPL: 14 (ref 12–20)
CALCIUM SERPL-MCNC: 8.6 MG/DL (ref 8.5–10.1)
CHLORIDE SERPL-SCNC: 105 MMOL/L (ref 97–108)
CO2 SERPL-SCNC: 28 MMOL/L (ref 21–32)
CREAT SERPL-MCNC: 0.57 MG/DL (ref 0.55–1.02)
ERYTHROCYTE [DISTWIDTH] IN BLOOD BY AUTOMATED COUNT: 13.7 % (ref 11.5–14.5)
GLOBULIN SER CALC-MCNC: 4.2 G/DL (ref 2–4)
GLUCOSE SERPL-MCNC: 101 MG/DL (ref 65–100)
HCT VFR BLD AUTO: 36.1 % (ref 35–47)
HGB BLD-MCNC: 11.3 G/DL (ref 11.5–16)
MAGNESIUM SERPL-MCNC: 2.2 MG/DL (ref 1.6–2.4)
MCH RBC QN AUTO: 30.8 PG (ref 26–34)
MCHC RBC AUTO-ENTMCNC: 31.3 G/DL (ref 30–36.5)
MCV RBC AUTO: 98.4 FL (ref 80–99)
PHOSPHATE SERPL-MCNC: 2.1 MG/DL (ref 2.6–4.7)
PLATELET # BLD AUTO: 266 K/UL (ref 150–400)
POTASSIUM SERPL-SCNC: 2.9 MMOL/L (ref 3.5–5.1)
PROT SERPL-MCNC: 6.5 G/DL (ref 6.4–8.2)
RBC # BLD AUTO: 3.67 M/UL (ref 3.8–5.2)
SODIUM SERPL-SCNC: 140 MMOL/L (ref 136–145)
WBC # BLD AUTO: 11.3 K/UL (ref 3.6–11)

## 2017-01-19 PROCEDURE — 74011000258 HC RX REV CODE- 258: Performed by: SURGERY

## 2017-01-19 PROCEDURE — 74011250637 HC RX REV CODE- 250/637: Performed by: SURGERY

## 2017-01-19 PROCEDURE — 36415 COLL VENOUS BLD VENIPUNCTURE: CPT | Performed by: NURSE PRACTITIONER

## 2017-01-19 PROCEDURE — 85027 COMPLETE CBC AUTOMATED: CPT | Performed by: NURSE PRACTITIONER

## 2017-01-19 PROCEDURE — 74011000250 HC RX REV CODE- 250: Performed by: SURGERY

## 2017-01-19 PROCEDURE — 74011250637 HC RX REV CODE- 250/637: Performed by: NURSE PRACTITIONER

## 2017-01-19 PROCEDURE — 74011250636 HC RX REV CODE- 250/636: Performed by: SURGERY

## 2017-01-19 PROCEDURE — 65210000002 HC RM PRIVATE GYN

## 2017-01-19 PROCEDURE — 74011000250 HC RX REV CODE- 250: Performed by: NURSE PRACTITIONER

## 2017-01-19 PROCEDURE — 83735 ASSAY OF MAGNESIUM: CPT | Performed by: NURSE PRACTITIONER

## 2017-01-19 PROCEDURE — C9113 INJ PANTOPRAZOLE SODIUM, VIA: HCPCS | Performed by: SURGERY

## 2017-01-19 PROCEDURE — 74020 XR ABD FLAT/ ERECT: CPT

## 2017-01-19 PROCEDURE — 74011250636 HC RX REV CODE- 250/636: Performed by: NURSE PRACTITIONER

## 2017-01-19 PROCEDURE — 80053 COMPREHEN METABOLIC PANEL: CPT | Performed by: NURSE PRACTITIONER

## 2017-01-19 PROCEDURE — 84100 ASSAY OF PHOSPHORUS: CPT | Performed by: NURSE PRACTITIONER

## 2017-01-19 RX ORDER — SODIUM CHLORIDE 0.9 % (FLUSH) 0.9 %
SYRINGE (ML) INJECTION
Status: DISPENSED
Start: 2017-01-19 | End: 2017-01-20

## 2017-01-19 RX ORDER — NYSTATIN 100000 [USP'U]/ML
500000 SUSPENSION ORAL 4 TIMES DAILY
Status: DISCONTINUED | OUTPATIENT
Start: 2017-01-19 | End: 2017-01-21 | Stop reason: HOSPADM

## 2017-01-19 RX ORDER — HYDRALAZINE HYDROCHLORIDE 25 MG/1
50 TABLET, FILM COATED ORAL EVERY 8 HOURS
Status: DISCONTINUED | OUTPATIENT
Start: 2017-01-19 | End: 2017-01-21 | Stop reason: HOSPADM

## 2017-01-19 RX ORDER — OXYCODONE AND ACETAMINOPHEN 5; 325 MG/1; MG/1
1-2 TABLET ORAL
Status: DISCONTINUED | OUTPATIENT
Start: 2017-01-19 | End: 2017-01-20

## 2017-01-19 RX ORDER — HYDROMORPHONE HYDROCHLORIDE 1 MG/ML
1 INJECTION, SOLUTION INTRAMUSCULAR; INTRAVENOUS; SUBCUTANEOUS
Status: DISCONTINUED | OUTPATIENT
Start: 2017-01-19 | End: 2017-01-19

## 2017-01-19 RX ORDER — CLONIDINE HYDROCHLORIDE 0.2 MG/1
0.2 TABLET ORAL
Status: DISCONTINUED | OUTPATIENT
Start: 2017-01-19 | End: 2017-01-21 | Stop reason: HOSPADM

## 2017-01-19 RX ADMIN — HYDRALAZINE HYDROCHLORIDE 50 MG: 25 TABLET, FILM COATED ORAL at 16:25

## 2017-01-19 RX ADMIN — HYDRALAZINE HYDROCHLORIDE 50 MG: 25 TABLET, FILM COATED ORAL at 08:14

## 2017-01-19 RX ADMIN — HYDROCHLOROTHIAZIDE 12.5 MG: 25 TABLET ORAL at 08:14

## 2017-01-19 RX ADMIN — HYDRALAZINE HYDROCHLORIDE 50 MG: 25 TABLET, FILM COATED ORAL at 23:01

## 2017-01-19 RX ADMIN — SODIUM CHLORIDE 40 MG: 9 INJECTION INTRAMUSCULAR; INTRAVENOUS; SUBCUTANEOUS at 00:41

## 2017-01-19 RX ADMIN — METOCLOPRAMIDE 10 MG: 5 INJECTION, SOLUTION INTRAMUSCULAR; INTRAVENOUS at 23:01

## 2017-01-19 RX ADMIN — PIPERACILLIN SODIUM,TAZOBACTAM SODIUM 3.38 G: 3; .375 INJECTION, POWDER, FOR SOLUTION INTRAVENOUS at 05:49

## 2017-01-19 RX ADMIN — SODIUM CHLORIDE, SODIUM LACTATE, POTASSIUM CHLORIDE, AND CALCIUM CHLORIDE 125 ML/HR: 600; 310; 30; 20 INJECTION, SOLUTION INTRAVENOUS at 12:42

## 2017-01-19 RX ADMIN — OXYCODONE HYDROCHLORIDE AND ACETAMINOPHEN 2 TABLET: 5; 325 TABLET ORAL at 23:07

## 2017-01-19 RX ADMIN — SODIUM CHLORIDE: 900 INJECTION, SOLUTION INTRAVENOUS at 17:47

## 2017-01-19 RX ADMIN — GUAIFENESIN 600 MG: 600 TABLET, EXTENDED RELEASE ORAL at 08:14

## 2017-01-19 RX ADMIN — METOCLOPRAMIDE 10 MG: 5 INJECTION, SOLUTION INTRAMUSCULAR; INTRAVENOUS at 00:41

## 2017-01-19 RX ADMIN — NYSTATIN 500000 UNITS: 100000 SUSPENSION ORAL at 13:00

## 2017-01-19 RX ADMIN — METOCLOPRAMIDE 10 MG: 5 INJECTION, SOLUTION INTRAMUSCULAR; INTRAVENOUS at 05:44

## 2017-01-19 RX ADMIN — METOCLOPRAMIDE 10 MG: 5 INJECTION, SOLUTION INTRAMUSCULAR; INTRAVENOUS at 11:00

## 2017-01-19 RX ADMIN — PIPERACILLIN SODIUM,TAZOBACTAM SODIUM 3.38 G: 3; .375 INJECTION, POWDER, FOR SOLUTION INTRAVENOUS at 13:36

## 2017-01-19 RX ADMIN — LOSARTAN POTASSIUM 100 MG: 50 TABLET ORAL at 08:14

## 2017-01-19 RX ADMIN — NYSTATIN 500000 UNITS: 100000 SUSPENSION ORAL at 17:41

## 2017-01-19 RX ADMIN — PIPERACILLIN SODIUM,TAZOBACTAM SODIUM 3.38 G: 3; .375 INJECTION, POWDER, FOR SOLUTION INTRAVENOUS at 23:00

## 2017-01-19 RX ADMIN — DOCUSATE SODIUM 100 MG: 100 CAPSULE, LIQUID FILLED ORAL at 17:41

## 2017-01-19 RX ADMIN — METOCLOPRAMIDE 10 MG: 5 INJECTION, SOLUTION INTRAMUSCULAR; INTRAVENOUS at 17:41

## 2017-01-19 RX ADMIN — DOCUSATE SODIUM 100 MG: 100 CAPSULE, LIQUID FILLED ORAL at 08:14

## 2017-01-19 RX ADMIN — CLONIDINE HYDROCHLORIDE 0.2 MG: 0.2 TABLET ORAL at 17:41

## 2017-01-19 RX ADMIN — GUAIFENESIN 600 MG: 600 TABLET, EXTENDED RELEASE ORAL at 23:01

## 2017-01-19 RX ADMIN — NYSTATIN 500000 UNITS: 100000 SUSPENSION ORAL at 23:01

## 2017-01-19 RX ADMIN — OXYCODONE HYDROCHLORIDE AND ACETAMINOPHEN 1 TABLET: 5; 325 TABLET ORAL at 13:34

## 2017-01-19 RX ADMIN — ENOXAPARIN SODIUM 40 MG: 40 INJECTION SUBCUTANEOUS at 10:27

## 2017-01-19 RX ADMIN — OXYCODONE HYDROCHLORIDE AND ACETAMINOPHEN 1 TABLET: 5; 325 TABLET ORAL at 18:32

## 2017-01-19 NOTE — PROGRESS NOTES
Bedside and Verbal shift change report given to Basil Navarro RN (oncoming nurse) by Moreno Treadwell RN (offgoing nurse). Report included the following information SBAR, Kardex, OR Summary, Procedure Summary, Intake/Output, MAR and Recent Results.

## 2017-01-19 NOTE — PROGRESS NOTES
Bedside and Verbal shift change report given to Mague Brsuh (oncoming nurse) by [facility] (offgoing nurse). Report included the following information SBAR, Kardex, MAR and Recent Results.

## 2017-01-19 NOTE — PROGRESS NOTES
Daily Progress Note  [facility] General Surgery at [address] Date: 1/15/2017  Post-Operative Day: 3 from Procedure(s):  LAPAROTOMY EXPLORATORY AND OPEN VENTRAL HERNIA REPAIR WITH MESH     Subjective:     Last 24 hrs: Feeling better today. + flatus, tolerating clears. Up in chair. WBC trending down, continues on zosyn. Ambulating in hallways. KUB this am with distended loops of bowel. Objective:     Blood pressure 160/70, pulse 76, temperature 98.6 °F (37 °C), resp. rate 16, height 5' 4\" (1.626 m), weight 149.7 kg (330 lb), SpO2 99 %. Temp (24hrs), Av.9 °F (37.2 °C), Min:98.6 °F (37 °C), Max:99.1 °F (37.3 °C)      _____________________  Physical Exam:     Alert and Oriented, no acute distress. White coating on tongue  Cardiovascular: RRR  Lungs:CTAB   Abdomen: soft, appropriately tender. HALLEY with ss drainage, 20 mL out yesterday. Assessment:   Active Problems:    Small bowel obstruction (HCC) (2017)    oral candidiasis    S/p exploratory laparotomy and open ventral hernia repair with mesh    Hypokalemia    hypophosphatemia    Plan:     Nystatin PO  Advance to full liquids as clinically she is doing better.   D/C PCA and transition to PO meds  Continue zosyn  Replete electrolytes  Labs in am        Jaime Cowden, [address] [facility] Surgery at [place] [address],  [facility], [address] [facility], [address] [facility]  [phone]    Data Review:    Recent Labs      17   013176   WBC  11.3*  13.2*  12.6*   HGB  11.3*  11.7  12.4   HCT  36.1  37.7  39.7   PLT  266  232  225     Recent Labs      17   01317   044   NA  140  142   K  2.9*  3.5   CL  105  107   CO2  28  27   GLU  101*  110*   BUN  8  8   CREA  0.57  0.71   CA  8.6  8.4*   MG  2.2   --    PHOS  2.1*   --    ALB  2.3*  2.5*   TBILI  0.7  1.1*   SGOT  10*  11*   ALT  13  13     No results for input(s): AML, LPSE in the last 72 hours.        ______________________  Medications:    Current Facility-Administered Medications   Medication Dose Route Frequency    simethicone (MYLICON) 72GF/6.8VY oral drops 40 mg  40 mg Oral QID PRN    metoclopramide HCl (REGLAN) injection 10 mg  10 mg IntraVENous Q6H    enoxaparin (LOVENOX) injection 40 mg  40 mg SubCUTAneous Q24H    HYDROmorphone (PF) (DILAUDID) injection 1 mg  1 mg IntraVENous Q3H PRN    lactated ringers infusion  125 mL/hr IntraVENous CONTINUOUS    ondansetron (ZOFRAN) injection 4 mg  4 mg IntraVENous Q4H PRN    prochlorperazine (COMPAZINE) with saline injection 5 mg  5 mg IntraVENous Q6H PRN    pantoprazole (PROTONIX) 40 mg in sodium chloride 0.9 % 10 mL injection  40 mg IntraVENous Q24H    docusate sodium (COLACE) capsule 100 mg  100 mg Oral BID    hydrALAZINE (APRESOLINE) tablet 50 mg  50 mg Oral BID    hydroCHLOROthiazide (HYDRODIURIL) tablet 12.5 mg  12.5 mg Oral DAILY    HYDROmorphone (PF) 15 mg/30 ml (DILAUDID) PCA   IntraVENous CONTINUOUS    acetaminophen (TYLENOL) tablet 650 mg  650 mg Oral Q6H PRN    piperacillin-tazobactam (ZOSYN) 3.375 g in 0.9% sodium chloride (MBP/ADV) 100 mL  3.375 g IntraVENous Q8H    losartan (COZAAR) tablet 100 mg  100 mg Oral DAILY    guaiFENesin SR (MUCINEX) tablet 600 mg  600 mg Oral Q12H    phenol throat spray (CHLORASEPTIC) 1 Spray  1 Spray Oral PRN       ADDENDUM:  Yousif Zhou MD    Pt seen and examined. No acute surgical issues. Pt passing some flatus. No nausea or vomiting. No BM yet. Pt tolerating full liquids. DC PCA and switch to oral pain medication.   Labs in am.

## 2017-01-20 LAB
ANION GAP BLD CALC-SCNC: 10 MMOL/L (ref 5–15)
BUN SERPL-MCNC: 7 MG/DL (ref 6–20)
BUN/CREAT SERPL: 15 (ref 12–20)
CALCIUM SERPL-MCNC: 9 MG/DL (ref 8.5–10.1)
CHLORIDE SERPL-SCNC: 104 MMOL/L (ref 97–108)
CO2 SERPL-SCNC: 27 MMOL/L (ref 21–32)
CREAT SERPL-MCNC: 0.48 MG/DL (ref 0.55–1.02)
ERYTHROCYTE [DISTWIDTH] IN BLOOD BY AUTOMATED COUNT: 13.2 % (ref 11.5–14.5)
GLUCOSE SERPL-MCNC: 100 MG/DL (ref 65–100)
HCT VFR BLD AUTO: 35 % (ref 35–47)
HGB BLD-MCNC: 11.2 G/DL (ref 11.5–16)
MCH RBC QN AUTO: 31.1 PG (ref 26–34)
MCHC RBC AUTO-ENTMCNC: 32 G/DL (ref 30–36.5)
MCV RBC AUTO: 97.2 FL (ref 80–99)
PLATELET # BLD AUTO: 278 K/UL (ref 150–400)
POTASSIUM SERPL-SCNC: 3.1 MMOL/L (ref 3.5–5.1)
RBC # BLD AUTO: 3.6 M/UL (ref 3.8–5.2)
SODIUM SERPL-SCNC: 141 MMOL/L (ref 136–145)
WBC # BLD AUTO: 8.9 K/UL (ref 3.6–11)

## 2017-01-20 PROCEDURE — C9113 INJ PANTOPRAZOLE SODIUM, VIA: HCPCS | Performed by: SURGERY

## 2017-01-20 PROCEDURE — 80048 BASIC METABOLIC PNL TOTAL CA: CPT | Performed by: NURSE PRACTITIONER

## 2017-01-20 PROCEDURE — 36415 COLL VENOUS BLD VENIPUNCTURE: CPT | Performed by: NURSE PRACTITIONER

## 2017-01-20 PROCEDURE — 74011000258 HC RX REV CODE- 258: Performed by: SURGERY

## 2017-01-20 PROCEDURE — 65210000002 HC RM PRIVATE GYN

## 2017-01-20 PROCEDURE — 74011000250 HC RX REV CODE- 250: Performed by: SURGERY

## 2017-01-20 PROCEDURE — 74011250637 HC RX REV CODE- 250/637: Performed by: NURSE PRACTITIONER

## 2017-01-20 PROCEDURE — 74011250637 HC RX REV CODE- 250/637: Performed by: SURGERY

## 2017-01-20 PROCEDURE — 85027 COMPLETE CBC AUTOMATED: CPT | Performed by: NURSE PRACTITIONER

## 2017-01-20 PROCEDURE — 74011250636 HC RX REV CODE- 250/636: Performed by: NURSE PRACTITIONER

## 2017-01-20 PROCEDURE — 74011250636 HC RX REV CODE- 250/636: Performed by: SURGERY

## 2017-01-20 RX ORDER — MINERAL OIL
30 OIL (ML) ORAL DAILY
Status: DISCONTINUED | OUTPATIENT
Start: 2017-01-20 | End: 2017-01-21 | Stop reason: HOSPADM

## 2017-01-20 RX ORDER — DOCUSATE SODIUM 100 MG/1
100 CAPSULE, LIQUID FILLED ORAL DAILY
Qty: 30 CAP | Refills: 0 | Status: SHIPPED | OUTPATIENT
Start: 2017-01-20 | End: 2019-12-05

## 2017-01-20 RX ORDER — HYDROMORPHONE HYDROCHLORIDE 2 MG/1
2 TABLET ORAL
Qty: 40 TAB | Refills: 0 | Status: SHIPPED | OUTPATIENT
Start: 2017-01-20 | End: 2019-12-05

## 2017-01-20 RX ORDER — HYDROMORPHONE HYDROCHLORIDE 2 MG/1
2 TABLET ORAL
Status: DISCONTINUED | OUTPATIENT
Start: 2017-01-20 | End: 2017-01-21 | Stop reason: HOSPADM

## 2017-01-20 RX ADMIN — METOCLOPRAMIDE 10 MG: 5 INJECTION, SOLUTION INTRAMUSCULAR; INTRAVENOUS at 07:14

## 2017-01-20 RX ADMIN — HYDROCHLOROTHIAZIDE 12.5 MG: 25 TABLET ORAL at 08:59

## 2017-01-20 RX ADMIN — NYSTATIN 500000 UNITS: 100000 SUSPENSION ORAL at 09:03

## 2017-01-20 RX ADMIN — HYDROMORPHONE HYDROCHLORIDE 2 MG: 2 TABLET ORAL at 18:45

## 2017-01-20 RX ADMIN — SODIUM CHLORIDE 40 MG: 9 INJECTION INTRAMUSCULAR; INTRAVENOUS; SUBCUTANEOUS at 01:03

## 2017-01-20 RX ADMIN — GUAIFENESIN 600 MG: 600 TABLET, EXTENDED RELEASE ORAL at 08:59

## 2017-01-20 RX ADMIN — LOSARTAN POTASSIUM 100 MG: 50 TABLET ORAL at 08:59

## 2017-01-20 RX ADMIN — NYSTATIN 500000 UNITS: 100000 SUSPENSION ORAL at 19:24

## 2017-01-20 RX ADMIN — NYSTATIN 500000 UNITS: 100000 SUSPENSION ORAL at 21:51

## 2017-01-20 RX ADMIN — PIPERACILLIN SODIUM,TAZOBACTAM SODIUM 3.38 G: 3; .375 INJECTION, POWDER, FOR SOLUTION INTRAVENOUS at 07:14

## 2017-01-20 RX ADMIN — MINERAL OIL 30 ML: 99.9 LIQUID ORAL; TOPICAL at 13:10

## 2017-01-20 RX ADMIN — ENOXAPARIN SODIUM 40 MG: 40 INJECTION SUBCUTANEOUS at 13:03

## 2017-01-20 RX ADMIN — METOCLOPRAMIDE 10 MG: 5 INJECTION, SOLUTION INTRAMUSCULAR; INTRAVENOUS at 13:00

## 2017-01-20 RX ADMIN — HYDROMORPHONE HYDROCHLORIDE 2 MG: 2 TABLET ORAL at 12:59

## 2017-01-20 RX ADMIN — HYDRALAZINE HYDROCHLORIDE 50 MG: 25 TABLET, FILM COATED ORAL at 15:38

## 2017-01-20 RX ADMIN — HYDRALAZINE HYDROCHLORIDE 50 MG: 25 TABLET, FILM COATED ORAL at 21:51

## 2017-01-20 RX ADMIN — GUAIFENESIN 600 MG: 600 TABLET, EXTENDED RELEASE ORAL at 20:47

## 2017-01-20 RX ADMIN — DOCUSATE SODIUM 100 MG: 100 CAPSULE, LIQUID FILLED ORAL at 08:59

## 2017-01-20 RX ADMIN — CLONIDINE HYDROCHLORIDE 0.2 MG: 0.2 TABLET ORAL at 03:29

## 2017-01-20 RX ADMIN — OXYCODONE HYDROCHLORIDE AND ACETAMINOPHEN 1 TABLET: 5; 325 TABLET ORAL at 08:56

## 2017-01-20 RX ADMIN — DOCUSATE SODIUM 100 MG: 100 CAPSULE, LIQUID FILLED ORAL at 19:23

## 2017-01-20 RX ADMIN — CLONIDINE HYDROCHLORIDE 0.2 MG: 0.2 TABLET ORAL at 23:13

## 2017-01-20 RX ADMIN — NYSTATIN 500000 UNITS: 100000 SUSPENSION ORAL at 12:59

## 2017-01-20 RX ADMIN — HYDRALAZINE HYDROCHLORIDE 50 MG: 25 TABLET, FILM COATED ORAL at 07:16

## 2017-01-20 NOTE — DISCHARGE INSTRUCTIONS
1.  Do not drive while on pain medication. You should take a stool softener while on pain medication to prevent constipation. 2.  Do not lift anything greater than 10 pounds for 4 weeks. 3.  You may resume your home medications. 4. You may shower but do not swim or soak in tub for 2 weeks. 5.  Please call [phone] to schedule a follow-up with Dr. Jim Camacho within 2 weeks. Abdominal Hernia Repair: What to Expect at Home  Your Recovery  After surgery to repair your hernia, you are likely to have pain for a few days. You may also feel like you have the flu, and you may have a low fever and feel tired and nauseated. This is common. You should feel better after a few days and will probably feel much better in 7 days. For several weeks you may feel twinges or pulling in the hernia repair when you move. You may have some bruising around the area of your hernia repair. This is normal.  This care sheet gives you a general idea about how long it will take for you to recover. But each person recovers at a different pace. Follow the steps below to get better as quickly as possible. How can you care for yourself at home? Activity  · Rest when you feel tired. Getting enough sleep will help you recover. · Try to walk each day. Start by walking a little more than you did the day before. Bit by bit, increase the amount you walk. Walking boosts blood flow and helps prevent pneumonia and constipation. · If your doctor gives you an abdominal binder to wear, use it as directed. This is an elastic bandage that wraps around your belly and upper hips. It helps support your belly muscles after surgery. · Avoid strenuous activities, such as biking, jogging, weight lifting, or aerobic exercise, until your doctor says it is okay. · Avoid lifting anything that would make you strain.  This may include heavy grocery bags and milk containers, a heavy briefcase or backpack, cat litter or dog food bags, a vacuum , or a child.  · Ask your doctor when you can drive again. · Most people are able to return to work within 1 to 2 weeks after surgery. But if your job requires that you to do heavy lifting or strenuous activity, you may need to take 4 to 6 weeks off from work. · You may shower 24 to 48 hours after surgery, if your doctor okays it. Pat the cut (incision) dry. Do not take a bath for the first 2 weeks, or until your doctor tells you it is okay. · Ask your doctor when it is okay for you to have sex. Diet  · You can eat your normal diet. If your stomach is upset, try bland, low-fat foods like plain rice, broiled chicken, toast, and yogurt. · Drink plenty of fluids (unless your doctor tells you not to). · You may notice that your bowel movements are not regular right after your surgery. This is common. Avoid constipation and straining with bowel movements. You may want to take a fiber supplement every day. If you have not had a bowel movement after a couple of days, ask your doctor about taking a mild laxative. Medicines  · Your doctor will tell you if and when you can restart your medicines. He or she will also give you instructions about taking any new medicines. · If you take blood thinners, such as warfarin (Coumadin), clopidogrel (Plavix), or aspirin, be sure to talk to your doctor. He or she will tell you if and when to start taking those medicines again. Make sure that you understand exactly what your doctor wants you to do. · Be safe with medicines. Take pain medicines exactly as directed. ¨ If the doctor gave you a prescription medicine for pain, take it as prescribed. ¨ If you are not taking a prescription pain medicine, ask your doctor if you can take an over-the-counter medicine. · If your doctor prescribed antibiotics, take them as directed. Do not stop taking them just because you feel better. You need to take the full course of antibiotics.   · If you think your pain medicine is making you sick to your stomach:  ¨ Take your medicine after meals (unless your doctor has told you not to). ¨ Ask your doctor for a different pain medicine. Incision care  · If you have strips of tape on the cut (incision) the doctor made, leave the tape on for a week or until it falls off. Or follow your doctor's instructions for removing the tape. · If you have staples closing the cut, you will need to visit your doctor in 1 to 2 weeks to have them removed. · Wash the area daily with warm, soapy water, and pat it dry. Don't use hydrogen peroxide or alcohol, which can slow healing. You may cover the area with a gauze bandage if it weeps or rubs against clothing. Change the bandage every day. Other instructions  · Hold a pillow over your incision when you cough or take deep breaths. This will support your belly and decrease your pain. · Do breathing exercises at home as instructed by your doctor. This will help prevent pneumonia. · If you had laparoscopic surgery, you may also have pain in your left shoulder. The pain usually lasts about a day or two. Follow-up care is a key part of your treatment and safety. Be sure to make and go to all appointments, and call your doctor if you are having problems. It's also a good idea to know your test results and keep a list of the medicines you take. When should you call for help? Call 911 anytime you think you may need emergency care. For example, call if:  · You passed out (lost consciousness). · You have sudden chest pain and shortness of breath, or you cough up blood. · You have severe pain in your belly. Call your doctor now or seek immediate medical care if:  · You are sick to your stomach and cannot keep fluids down. · You have signs of a blood clot, such as:  ¨ Pain in your calf, back of the knee, thigh, or groin. ¨ Redness and swelling in your leg or groin. · You have signs of infection, such as:  ¨ Increased pain, swelling, warmth, or redness.   ¨ Red streaks leading from the incision. ¨ Pus draining from the incision. ¨ A fever. · You have trouble passing urine or stool, especially if you have mild pain or swelling in your lower belly. · Bright red blood has soaked through the bandage over your incision. Watch closely for changes in your health, and be sure to contact your doctor if:  · Your swelling is getting worse. · Your swelling is not going down. · You still don't have a bowel movement after taking a laxative. Where can you learn more? Go to http://kerri-mary.info/. Enter B577 in the search box to learn more about \"Abdominal Hernia Repair: What to Expect at Home. \"  Current as of: August 9, 2016  Content Version: 11.1  © 8053-6459 [employer]. Care instructions adapted under license by [employer] (which disclaims liability or warranty for this information). If you have questions about a medical condition or this instruction, always ask your healthcare professional. [place] [address] any warranty or liability for your use of this information.

## 2017-01-20 NOTE — OP NOTES
[address]   Amarjit Nip, [address]   OP NOTE       Name:  Lucie Lenz   MR#:  [id number]   :  1950   Account #:  [de-identified]    Surgery Date:  2017   Date of Adm:  01/15/2017       PREOPERATIVE DIAGNOSIS: Incarcerated ventral hernia. POSTOPERATIVE DIAGNOSIS: Incarcerated ventral hernia. PROCEDURES PERFORMED: Exploratory laparotomy with open   hernia, ventral hernia repair with mesh. PRIMARY SURGEON: Brandon Barrera MD    ANESTHESIA: General endotracheal.    ESTIMATED BLOOD LOSS: Minimal.    SPECIMENS REMOVED: Hernia sac. FINDINGS: There were several hernia in the lower midline incision. There was a 4 cm hernia to the left of the umbilicus, which small bowel   causing incarceration and small-bowel obstruction. There was no   evidence of small bowel ischemia or necrosis. Lysis of adhesion was   performed, which took approximately 3 hours. COMPLICATIONS: None. IMPLANTS: A 20 x 15 Parietex mesh was used. INDICATION FOR OPERATION: The patient is a 71-year-old woman   with a history of obstructive sleep apnea and incisional hernia repair,   hypertension and morbid obesity, who was transferred from [place] for an incarcerated ventral hernia. The patient reported   nausea, vomiting and abdominal pain. Given her incarcerated hernia,   the decision was made to take her emergently to the operating room   for an exploratory laparotomy and possible bowel resection with hernia   repair. DESCRIPTION OF PROCEDURE: After informed consent was   obtained from the patient, the patient was taken to the operating room   and placed in supine position on the operating room table. She   underwent general anesthesia with endotracheal intubation without any   complication. The abdomen and pelvis were then prepped and draped   in the usual sterile surgical fashion. A surgical time-out was then   performed.  The patient received preoperative antibiotics 30 minutes   prior to skin incision. After the time-out was performed and a midline incision was made with   an 11 blade scalpel, extending from the supraumbilical umbilicus to the   suprapubic area, dissection was then taken down using a combination   of blunt dissection and electrocautery. The hernia sac was then entered   at the midline just above the umbilicus. We then meticulously dissected   out the hernia. There were multiple abdominal hernias with the 4 cm   hernia to the left of the umbilicus, causing a small-bowel obstruction. With meticulous dissection and lysis of adhesions requiring 3 hours of   lysis of adhesions, we were able to free up all the bowel from the   hernia contents. Extensive lysis of adhesion was performed using   Metzenbaum scissors and electrocautery. Once all the adhesions were   taken down, the small bowel was then placed back into the peritoneal   cavity. There was no evidence of enterotomy or bowel ischemia. The   bowel was traumatized from manipulation. Once the small bowel had   been freed and delivered back into the peritoneal cavity, we then   excised the hernia sac. A 15 x 20 Parietex mesh was then brought into   the operative field. We then placed 0 Prolene sutures circumferentially   around the mesh. The mesh was then anchored to the fascia, in an   underlay fashion. Once the Prolene sutures was anchored we then it   tied down. This provided us with a circumferential interrupted Prolene   closure of the hernia defect. After this was completed, we then closed   the fascia over the midline. DRAINS: A 19-Estonian Kevin drain was then placed between the fascia   and the hernia mesh. The skin was then closed with skin staples. The   drain was secured with 2-0 nylon. A dry dressing was then placed over   the site. The patient tolerated the procedure well. There were no complications   associated with the operation.  Dr. Memo Dudley, the attending surgeon,   was present during the entirety of the case. All lap, needle, and   instrument counts were correct x2. The patient was successfully   extubated and was transported to PACU in stable condition.         Elizabeth Olivarez MD      SS / CD   D:  01/19/2017   22:40   T:  01/20/2017   06:25   Job #:  [id number]

## 2017-01-20 NOTE — PROGRESS NOTES
Bedside shift change report given to [place] (oncoming nurse) by Raghu Hidalgo (offgoing nurse). Report included the following information SBAR, Kardex, Procedure Summary, Intake/Output, MAR, Accordion, Recent Results and Med Rec Status.

## 2017-01-20 NOTE — PROGRESS NOTES
Daily Progress Note  [facility] General Surgery at [address] Date: 1/15/2017  Post-Operative Day: 4 from Procedure(s):  LAPAROTOMY EXPLORATORY AND OPEN VENTRAL HERNIA REPAIR WITH MESH     Subjective:     No acute surgical issues. Pt reported incisional pain. No nausea or vomiting. Pt is passing flatus but no BM yet. WBC normalized. Objective:     Blood pressure 130/62, pulse 72, temperature 97.8 °F (36.6 °C), resp. rate 17, height 5' 4\" (1.626 m), weight 330 lb (149.7 kg), SpO2 100 %. Temp (24hrs), Av.7 °F (36.5 °C), Min:97.6 °F (36.4 °C), Max:97.8 °F (36.6 °C)      _____________________  Physical Exam:     Alert and Oriented, no acute distress. White coating on tongue  Cardiovascular: RRR  Lungs:CTAB   Abdomen: soft, appropriately tender. HALLEY with ss drainage    Assessment:   Active Problems:    Small bowel obstruction (HCC) (2017)    oral candidiasis    S/p exploratory laparotomy and open ventral hernia repair with mesh    Hypokalemia    hypophosphatemia    Plan:     - Nystatin PO  - Advance to GI lite diet  - PO dilaudid with IV dilaudid for breakthrough pain  - Continue zosyn  - Bowel regiment  - Possible DC home Saturday or  when return of bowel function  - DC HALLEY drain prior to discharge     Review:    Recent Labs      17   0335  17   01317   WBC  8.9  11.3*  13.2*   HGB  11.2*  11.3*  11.7   HCT  35.0  36.1  37.7   PLT  278  266  232     Recent Labs      17   03317   013   NA  141  140   K  3.1*  2.9*   CL  104  105   CO2  27  28   GLU  100  101*   BUN  7  8   CREA  0.48*  0.57   CA  9.0  8.6   MG   --   2.2   PHOS   --   2.1*   ALB   --   2.3*   TBILI   --   0.7   SGOT   --   10*   ALT   --   13     No results for input(s): AML, LPSE in the last 72 hours.         ______________________  Medications:    Current Facility-Administered Medications   Medication Dose Route Frequency    mineral oil 30 mL  30 mL Oral DAILY    HYDROmorphone (DILAUDID) tablet 2 mg  2 mg Oral Q4H PRN    nystatin (MYCOSTATIN) 100,000 unit/mL oral suspension 500,000 Units  500,000 Units Oral QID    cloNIDine HCl (CATAPRES) tablet 0.2 mg  0.2 mg Oral Q4H PRN    hydrALAZINE (APRESOLINE) tablet 50 mg  50 mg Oral Q8H    simethicone (MYLICON) 98LO/7.2IB oral drops 40 mg  40 mg Oral QID PRN    metoclopramide HCl (REGLAN) injection 10 mg  10 mg IntraVENous Q6H    enoxaparin (LOVENOX) injection 40 mg  40 mg SubCUTAneous Q24H    HYDROmorphone (PF) (DILAUDID) injection 1 mg  1 mg IntraVENous Q3H PRN    lactated ringers infusion  50 mL/hr IntraVENous CONTINUOUS    ondansetron (ZOFRAN) injection 4 mg  4 mg IntraVENous Q4H PRN    prochlorperazine (COMPAZINE) with saline injection 5 mg  5 mg IntraVENous Q6H PRN    pantoprazole (PROTONIX) 40 mg in sodium chloride 0.9 % 10 mL injection  40 mg IntraVENous Q24H    docusate sodium (COLACE) capsule 100 mg  100 mg Oral BID    hydroCHLOROthiazide (HYDRODIURIL) tablet 12.5 mg  12.5 mg Oral DAILY    acetaminophen (TYLENOL) tablet 650 mg  650 mg Oral Q6H PRN    piperacillin-tazobactam (ZOSYN) 3.375 g in 0.9% sodium chloride (MBP/ADV) 100 mL  3.375 g IntraVENous Q8H    losartan (COZAAR) tablet 100 mg  100 mg Oral DAILY    guaiFENesin SR (MUCINEX) tablet 600 mg  600 mg Oral Q12H    phenol throat spray (CHLORASEPTIC) 1 Spray  1 Spray Oral PRN

## 2017-01-21 VITALS
HEIGHT: 64 IN | SYSTOLIC BLOOD PRESSURE: 184 MMHG | BODY MASS INDEX: 50.02 KG/M2 | TEMPERATURE: 97.6 F | DIASTOLIC BLOOD PRESSURE: 88 MMHG | RESPIRATION RATE: 16 BRPM | OXYGEN SATURATION: 99 % | HEART RATE: 64 BPM | WEIGHT: 293 LBS

## 2017-01-21 PROCEDURE — 74011250637 HC RX REV CODE- 250/637: Performed by: NURSE PRACTITIONER

## 2017-01-21 PROCEDURE — 74011250637 HC RX REV CODE- 250/637: Performed by: SURGERY

## 2017-01-21 PROCEDURE — 74011250636 HC RX REV CODE- 250/636: Performed by: NURSE PRACTITIONER

## 2017-01-21 RX ADMIN — HYDROMORPHONE HYDROCHLORIDE 2 MG: 2 TABLET ORAL at 06:03

## 2017-01-21 RX ADMIN — HYDROMORPHONE HYDROCHLORIDE 2 MG: 2 TABLET ORAL at 10:35

## 2017-01-21 RX ADMIN — GUAIFENESIN 600 MG: 600 TABLET, EXTENDED RELEASE ORAL at 10:31

## 2017-01-21 RX ADMIN — HYDRALAZINE HYDROCHLORIDE 50 MG: 25 TABLET, FILM COATED ORAL at 06:00

## 2017-01-21 RX ADMIN — NYSTATIN 500000 UNITS: 100000 SUSPENSION ORAL at 13:39

## 2017-01-21 RX ADMIN — HYDROMORPHONE HYDROCHLORIDE 2 MG: 2 TABLET ORAL at 00:33

## 2017-01-21 RX ADMIN — HYDROCHLOROTHIAZIDE 12.5 MG: 25 TABLET ORAL at 10:29

## 2017-01-21 RX ADMIN — DOCUSATE SODIUM 100 MG: 100 CAPSULE, LIQUID FILLED ORAL at 09:00

## 2017-01-21 RX ADMIN — CLONIDINE HYDROCHLORIDE 0.2 MG: 0.2 TABLET ORAL at 10:35

## 2017-01-21 RX ADMIN — ACETAMINOPHEN 650 MG: 325 TABLET, FILM COATED ORAL at 10:35

## 2017-01-21 RX ADMIN — ENOXAPARIN SODIUM 40 MG: 40 INJECTION SUBCUTANEOUS at 10:35

## 2017-01-21 RX ADMIN — LOSARTAN POTASSIUM 100 MG: 50 TABLET ORAL at 10:30

## 2017-01-21 NOTE — PROGRESS NOTES
Bedside shift change report given to April and Aliza Lozano (oncoming nurse) by Shayla Pugh (offgoing nurse). Report included the following information SBAR, Kardex, Procedure Summary, Intake/Output, MAR, Accordion, Recent Results and Med Rec Status.

## 2017-01-21 NOTE — PROGRESS NOTES
General Surgery Daily Progress Note    Admit Date: 1/15/2017  Post-Operative Day: 5 Days Post-Op from Procedure(s):  LAPAROTOMY EXPLORATORY AND OPEN VENTRAL HERNIA REPAIR WITH MESH     Subjective:     Last 24 hrs: Pt is up in the chair. Feels well. Tolerated breakfast and had a BM   No pain issues. Some itching under binder. BP up and down    Objective:     Blood pressure 132/75, pulse (!) 54, temperature 97.8 °F (36.6 °C), resp. rate 15, height 5' 4\" (1.626 m), weight 330 lb (149.7 kg), SpO2 96 %. Temp (24hrs), Av.5 °F (36.4 °C), Min:97.2 °F (36.2 °C), Max:97.8 °F (36.6 °C)      _____________________  Physical Exam:     Alert and Oriented, x3, in no acute distress. Cardiovascular: RRR, no peripheral edema  Lungs:CTAB   Abdomen: soft, nl BS, incision is clean w/ staples, HALLEY w/ minimal ss drainage      Assessment:   Active Problems:    Small bowel obstruction ([address] (2017)            Plan:   D/c HALLEY  D/c home  F/u w/ Dr Migue Copeland  Monitor BP at home - is still elevated instructed her to call her PCP    Data Review:    Recent Labs      17   0335  17   0131   WBC  8.9  11.3*   HGB  11.2*  11.3*   HCT  35.0  36.1   PLT  278  266     Recent Labs      17   0335  17   013   NA  141  140   K  3.1*  2.9*   CL  104  105   CO2  27  28   GLU  100  101*   BUN  7  8   CREA  0.48*  0.57   CA  9.0  8.6   MG   --   2.2   PHOS   --   2.1*   ALB   --   2.3*   SGOT   --   10*   ALT   --   13     No results for input(s): AML, LPSE in the last 72 hours.         ______________________  Medications:    Current Facility-Administered Medications   Medication Dose Route Frequency    mineral oil 30 mL  30 mL Oral DAILY    HYDROmorphone (DILAUDID) tablet 2 mg  2 mg Oral Q4H PRN    nystatin (MYCOSTATIN) 100,000 unit/mL oral suspension 500,000 Units  500,000 Units Oral QID    cloNIDine HCl (CATAPRES) tablet 0.2 mg  0.2 mg Oral Q4H PRN    hydrALAZINE (APRESOLINE) tablet 50 mg  50 mg Oral Q8H    simethicone (MYLICON) 69HD/8.7KY oral drops 40 mg  40 mg Oral QID PRN    enoxaparin (LOVENOX) injection 40 mg  40 mg SubCUTAneous Q24H    HYDROmorphone (PF) (DILAUDID) injection 1 mg  1 mg IntraVENous Q3H PRN    lactated ringers infusion  50 mL/hr IntraVENous CONTINUOUS    ondansetron (ZOFRAN) injection 4 mg  4 mg IntraVENous Q4H PRN    prochlorperazine (COMPAZINE) with saline injection 5 mg  5 mg IntraVENous Q6H PRN    docusate sodium (COLACE) capsule 100 mg  100 mg Oral BID    hydroCHLOROthiazide (HYDRODIURIL) tablet 12.5 mg  12.5 mg Oral DAILY    acetaminophen (TYLENOL) tablet 650 mg  650 mg Oral Q6H PRN    losartan (COZAAR) tablet 100 mg  100 mg Oral DAILY    guaiFENesin SR (MUCINEX) tablet 600 mg  600 mg Oral Q12H    phenol throat spray (CHLORASEPTIC) 1 Spray  1 Spray Oral PRN

## 2017-01-24 NOTE — DISCHARGE SUMMARY
Physician Discharge Summary     Patient ID:  Bucky Gay  [id number]  2 y.o.  1950    Admit Date: 1/15/2017    Discharge Date: 1/21/2017  * Admission Diagnoses: Small bowel obstruction (HCC);INCARCERATED VENTRAL HERNIA/    * Discharge Diagnoses:    Hospital Problems as of 1/21/2017  Date Reviewed: 12/10/2013          Codes Class Noted - Resolved POA    Small bowel obstruction ([facility] [address] ICD-10-CM: K56.69  ICD-9-CM: 560.9  1/16/2017 - Present Unknown               Admission Condition: Fair    * Discharge Condition: good    * Procedures: Procedure(s):  LAPAROTOMY EXPLORATORY AND OPEN VENTRAL HERNIA REPAIR WITH MESH    * Hospital Course:   Mrs. Edelmira Toscano is a 60 yo woman with hx morbid obesity ventral hernia with recurrence s/p repair who was transferred from [place] for incarcerated ventral hernia. She was taken to the operating room for an emergency hernia repair with mesh and lysis of adhesions. Post-operatively she did well and soon had return of bowel function. Her diet was advanced. Once her pain was under control and she was able to tolerate a diet, she was discharged home to follow-up in clinic in 2 weeks. Consults: None    Significant Diagnostic Studies: radiology: KUB: Abdominal xray    * Disposition: Home    Discharge Medications:   Discharge Medication List as of 1/21/2017 11:25 AM      CONTINUE these medications which have CHANGED    Details   docusate sodium (COLACE) 100 mg capsule Take 1 Cap by mouth daily. , Print, Disp-30 Cap, R-0      HYDROmorphone (DILAUDID) 2 mg tablet Take 1 Tab by mouth every four (4) hours as needed. , Print, Disp-40 Tab, R-0         CONTINUE these medications which have NOT CHANGED    Details   hydrocortisone (HYTONE) 2.5 % topical cream APPLY A THIN LAYER TOPICALLY  TO ABDOMEN  TWICE DAILY AS NEEDED, Normal, Disp-30 oz, R-0      ACETAMINOPHEN (TYLENOL PO) Take  by mouth., Historical Med      telmisartan (MICARDIS) 80 mg tablet Take 80 mg by mouth daily. , Historical Med hydrochlorothiazide (MICROZIDE) 12.5 mg capsule Take 12.5 mg by mouth daily. , Historical Med      hydrALAZINE (APRESOLINE) 100 mg tablet Take 50 mg by mouth two (2) times a day., Historical Med             * Follow-up Care/Patient Instructions:   Activity: No lifting, Driving, or Strenuous exercise for 4 weeks  Diet: Regular Diet  Wound Care: Keep wound clean and dry    Follow-up Information     Follow up With Details Comments Contact Info    Phys Other, MD   Patient can only remember the practice name and not the physician          Follow-up tests/labs None    Signed:  Alen Ramsay MD  1/24/2017  3:36 PM

## 2017-02-07 ENCOUNTER — OFFICE VISIT (OUTPATIENT)
Dept: SURGERY | Age: 67
End: 2017-02-07

## 2017-02-07 VITALS
RESPIRATION RATE: 20 BRPM | TEMPERATURE: 97.8 F | DIASTOLIC BLOOD PRESSURE: 90 MMHG | OXYGEN SATURATION: 97 % | HEIGHT: 64 IN | SYSTOLIC BLOOD PRESSURE: 140 MMHG | WEIGHT: 293 LBS | BODY MASS INDEX: 50.02 KG/M2 | HEART RATE: 65 BPM

## 2017-02-07 DIAGNOSIS — K43.6 VENTRAL HERNIA WITH OBSTRUCTION: Primary | ICD-10-CM

## 2017-02-07 NOTE — PROGRESS NOTES
1. Have you been to the ER, urgent care clinic since your last visit? Hospitalized since your last visit? No    2. Have you seen or consulted any other health care providers outside of the [facility] since your last visit? Include any pap smears or colon screening.  No

## 2017-02-24 NOTE — PROGRESS NOTES
Reason for Visit:  Follow-up incisional hernia repair with mesh    Brief History:  60 yo woman with hx recurrent incisional hernia who presented from [place] with incarcerated ventral hernia s/p repair presented for follow-up. Pt has been doing well since discharged. No nausea or vomiting. Pain has been under control. Pt tolerating diet. Pt passing flatus and having BM. Visit Vitals    /90 (BP 1 Location: Right arm, BP Patient Position: Sitting)    Pulse 65    Temp 97.8 °F (36.6 °C) (Oral)    Resp 20    Ht 5' 4\" (1.626 m)    Wt 330 lb (149.7 kg)    SpO2 97%    BMI 56.64 kg/m2         Physical Exam:    Gen:  NAD  Pulm:  Unlabored  Abd:  S/ND/obese/ appropriate TTP/ firm mass to left of umbilicus consistent with scar tissue.   No evidence of recurrent hernia  Wound:  C/D/I    AP: 60 yo woman s/p ventral hernia repair presented for follow-up    - No acute surgical issues  - Staples removed in office  - Follow-up prn

## 2019-11-07 ENCOUNTER — TELEPHONE (OUTPATIENT)
Dept: ENDOCRINOLOGY | Age: 69
End: 2019-11-07

## 2019-11-07 NOTE — TELEPHONE ENCOUNTER
----- Message from Maren Nieves sent at 11/7/2019 10:08 AM EST -----  Regarding: RE: New Patient Scheduling Question   Thank you so much Dr. Mariella Wright. She chose the December 5th appointment at 1:50pm.    Thank you so much! Jessica   ----- Message -----  From: Abraham Armenta MD  Sent: 11/6/2019   6:05 PM EST  To: Maren Nieves  Subject: RE: New Patient Scheduling Question              I can do 9:10 on Tues 12/3 or 10:10 or 1:50 on Thurs 12/5.  ----- Message -----  From: Severiano Sunday  Sent: 11/6/2019   9:30 AM EST  To: Bautista Strickland MD  Subject: RE: New Patient Scheduling Question              Good Morning Dr. Mariella Wright,    I had the chance to speak to Mrs. Sulma Reynaga and offer her the date and time that you had mentioned below. She stated that her mom just had a biopsy done on yesterday and the results won't be back by the 14th and she didn't want to waste your time or a visit because you will have know information to go by. Mrs. Sulma Reynaga said she can do if you have anything the first week of December the 2nd-6th or December 16th-20th if that may work also. Thanks  Jessica   ----- Message -----  From: Abraham Armenta MD  Sent: 11/4/2019   7:33 PM EST  To: Maren Nieves  Subject: RE: New Patient Scheduling Question              I don't have any availability these 2 dates as I've already overbooked these slots. Can she come on Thurs 11/14/19 at 1:30pm?  ----- Message -----  From: Severiano Sunday  Sent: 11/4/2019   3:42 PM EST  To: Bautista Strickland MD  Subject: New Patient Scheduling Question                  11/4/2019  3:43 PM    Good Afternoon Dr. Mariella Wright,    [facility] Trainer for [facility] reached out to me and she would like for you to see her mom as a new patient for Hyperthyroidism. Sulma Reynaga stated that she is off on December 9th and December 10th and can bring her either day at any time that will work for you.   I wanted to reach out to see if either of theses days would work for you to see Mrs. Autumn Borden mom. I can schedule her also for you when a date and time has been confirmed.     Thanks  [employer]

## 2019-12-05 ENCOUNTER — TELEPHONE (OUTPATIENT)
Dept: ENDOCRINOLOGY | Age: 69
End: 2019-12-05

## 2019-12-05 ENCOUNTER — OFFICE VISIT (OUTPATIENT)
Dept: ENDOCRINOLOGY | Age: 69
End: 2019-12-05

## 2019-12-05 VITALS
OXYGEN SATURATION: 99 % | HEIGHT: 64 IN | WEIGHT: 247.2 LBS | BODY MASS INDEX: 42.2 KG/M2 | DIASTOLIC BLOOD PRESSURE: 84 MMHG | SYSTOLIC BLOOD PRESSURE: 167 MMHG

## 2019-12-05 DIAGNOSIS — E05.00 GRAVES DISEASE: Primary | ICD-10-CM

## 2019-12-05 PROBLEM — E66.01 OBESITY, MORBID (HCC): Status: ACTIVE | Noted: 2019-12-05

## 2019-12-05 RX ORDER — HYDRALAZINE HYDROCHLORIDE 50 MG/1
50 TABLET, FILM COATED ORAL 3 TIMES DAILY
COMMUNITY
End: 2020-06-03

## 2019-12-05 RX ORDER — LOSARTAN POTASSIUM 100 MG/1
TABLET ORAL
Refills: 3 | COMMUNITY
Start: 2019-11-22

## 2019-12-05 RX ORDER — POTASSIUM CHLORIDE 20 MEQ/1
20 TABLET, EXTENDED RELEASE ORAL DAILY
COMMUNITY

## 2019-12-05 RX ORDER — FUROSEMIDE 40 MG/1
40-80 TABLET ORAL DAILY
COMMUNITY

## 2019-12-05 RX ORDER — METHIMAZOLE 10 MG/1
15 TABLET ORAL DAILY
Refills: 2 | COMMUNITY
Start: 2019-11-22 | End: 2020-06-06 | Stop reason: SDUPTHER

## 2019-12-05 RX ORDER — DABIGATRAN ETEXILATE 150 MG/1
CAPSULE ORAL
COMMUNITY
End: 2021-06-18

## 2019-12-05 NOTE — PATIENT INSTRUCTIONS
1) It appears you have a thyroid condition called Grave's disease that is causing your thyroid to go too fast (hyperthyroidism). We will treat this with methimazole and you will stay on your current dose of 20 mg per day = 2 tabs per day and it's fine to either take 1 twice daily or 2 together once daily. 2) Do this for another 5 weeks and repeat your labs at your PCP's office and have them fax me a copy of the results. Take the lab slip to use as a standing order and I will determine when to repeat your labs again. 3) We'll be in touch with the results to determine how to adjust your dose. 4) Download the [employer] liz from the liz store ([employer]) or Google play store ([employer]) (make sure you allow locations and choose the [employer] portal and choose to receive notifications) so you can communicate with me through the patient portal.  I will send you a message with your lab results.

## 2019-12-05 NOTE — PROGRESS NOTES
Chief Complaint   Patient presents with    Thyroid Problem     Thyroid nodule     History of Present Illness: Yumiko Pearson is a 81 y.o. female who is a new patient for thyroid. Her daughter is Bradford Isabel from our [place] team.  Around June 2019 she was having trouble with a cough that persisted for about a month and was also having some fatigue and was throwing up after eating and this led to a lot of weight loss as she used to be around 325 lbs in the spring of 2019. Her PCP sent her to a lung specialist and this didn't show a cause and was sent to her cardiologist and he didn't find anything. Then was sent to an ENT and previously had seen this doctor and he noticed a thyroid nodule but previously this wasn't seen but since she had lost weight it was more noticeable. Then had labs drawn on 10/14/19 that showed her TSH was low at 0.01 and FT4 was high at 5.27 confirming she has hyperthyroidism. Was sent for a thyroid ultrasound in 10/19 that showed a large multinodular goiter with 4 nodules in the 2.3-5.1 cm range. Also had a thyroid uptake scan that showed 63.4% uptake consistent with Grave's as there were no hot nodules. Was sent to a thyroid surgeon, Dr. Nona Fairbanks and he ordered a biopsy and this came back without any evidence of thyroid cancer in her left nodule that was cold on appearance on the thyroid scan. Was started on methimazole 10 mg bid on 10/26/19 and had repeat labs on 11/29/19 that showed her TSH was 0.00 and FT4 down to 1.29. Has noticed occ heart palpitations but no chest pain. No tremors. No heat intolerance. No increased bowel frequency. No hair loss. Does have some fatigue that has improved on the methimazole. No shortness of breath. No trouble sleeping aside from the sleep apnea. Some anxiety. Her appetite has been coming back over the past few weeks. No dysphagia, dyspnea when supine, or hoarseness.         Past Medical History:   Diagnosis Date    Arthritis     Atrial fibrillation ([facility] [address]     Graves disease 10/2019    with large multinodular goiter, s/p negative biopsy of cold nodule in left lobe 11/19    Hypertension     Obesity     MELQUIADES on CPAP     Stroke ([facility] 07/2018    still has some mild affect on her vision and speech     Past Surgical History:   Procedure Laterality Date    HX GYN      tubal ligation    HX HERNIA REPAIR  5-19-14    open incisional hernia repair with underlay mesh and extensive  lysis of adhesions for 2 hours - [facility]- DR. Felecia WILKES HERNIA REPAIR  01/16/2017    exp. Lap. with open ventral hernia repair with mesh-[facility]-Dr. Kaycee Pérez. [place]    HX KNEE REPLACEMENT Right     HX OTHER SURGICAL  12-12-13    PSBO with ventral hernia repair by DDDR. Odalis Oneil - [facility]    HX ROTATOR CUFF REPAIR Right      Current Outpatient Medications   Medication Sig    losartan (COZAAR) 100 mg tablet TAKE 1 TABLET BY MOUTH ONCE DAILY. (D C LOSARTAN HCTZ)    methIMAzole (TAPAZOLE) 10 mg tablet TAKE 1 TABLET (10MG) BY MOUTH TWICE DAILY    furosemide (LASIX) 40 mg tablet Take 80 mg by mouth daily.  potassium chloride (K-DUR, KLOR-CON) 20 mEq tablet daily.  dabigatran etexilate (PRADAXA) 150 mg capsule Pradaxa 150 mg capsule   Take 1 capsule twice a day by oral route.  hydrALAZINE (APRESOLINE) 50 mg tablet Take 50 mg by mouth three (3) times daily.  ACETAMINOPHEN (TYLENOL PO) Take  by mouth.  hydrochlorothiazide (MICROZIDE) 12.5 mg capsule Take 12.5 mg by mouth daily. No current facility-administered medications for this visit.       No Known Allergies     Family History   Problem Relation Age of Onset    Heart Attack Mother 80    Diabetes Mother     Stroke Father 52    Diabetes Sister     Thyroid Disease Sister         hyperthyroidism    Diabetes Brother     Thyroid Disease Son         developed after kidney removal but since normalized     Social History     Socioeconomic History    Marital status:      Spouse name: Not on file    Number of children: Not on file    Years of education: Not on file    Highest education level: Not on file   Occupational History    Not on file   Social Needs    Financial resource strain: Not on file    Food insecurity:     Worry: Not on file     Inability: Not on file    Transportation needs:     Medical: Not on file     Non-medical: Not on file   Tobacco Use    Smoking status: Former Smoker     Packs/day: 0.50     Years: 23.00     Pack years: 11.50     Last attempt to quit: 1998     Years since quittin.9    Smokeless tobacco: Never Used   Substance and Sexual Activity    Alcohol use: Yes     Comment: a couple times a year    Drug use: Not Currently    Sexual activity: Not on file   Lifestyle    Physical activity:     Days per week: Not on file     Minutes per session: Not on file    Stress: Not on file   Relationships    Social connections:     Talks on phone: Not on file     Gets together: Not on file     Attends Christianity service: Not on file     Active member of club or organization: Not on file     Attends meetings of clubs or organizations: Not on file     Relationship status: Not on file    Intimate partner violence:     Fear of current or ex partner: Not on file     Emotionally abused: Not on file     Physically abused: Not on file     Forced sexual activity: Not on file   Other Topics Concern    Not on file   Social History Narrative    Lives in [place], [place] which is near [place] with her  and son. Also has 1 other son and 2 other daughters. Currently on disability. 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 Likes to go fishing and [employer].        Review of Systems:  - Constitutional Symptoms: no fevers, chills, (+) weight loss  - Eyes: no blurry vision or double vision  - Cardiovascular: no chest pain, (+) palpitations  - Respiratory: some cough that has improved, no shortness of breath  - Gastrointestinal: no dysphagia or abdominal pain  - Musculoskeletal: some knee pains  - Integumentary: no rashes  - Neurological: occ numbness, tingling, no headaches  - Psychiatric: no depression or anxiety  - Endocrine: no heat or cold intolerance, no polyuria or polydipsia    Physical Examination:  Blood pressure 167/84, height 5' 4\" (1.626 m), weight 247 lb 3.2 oz (112.1 kg), SpO2 99 %. - General: pleasant, no distress, good eye contact  - HEENT: no exopthalmos, no periorbital edema, no scleral/conjunctival injection, EOMI, no lid lag or stare  - Neck: supple, (+) bulky multinodular goiter with thyroid bruit, no masses, lymph nodes, or carotid bruits, no supraclavicular or dorsocervical fat pads  - Cardiovascular: regular, normal rate, normal S1 and S2, no murmurs/rubs/gallops   - Respiratory: clear to auscultation bilaterally  - Gastrointestinal: soft, nontender, nondistended, no masses, no hepatosplenomegaly  - Musculoskeletal: no proximal muscle weakness in upper or lower extremities  - Integumentary: no acanthosis nigricans, no abdominal striae, no rashes, no edema  - Neurological: reflexes 2+ at biceps, (+) mild tremor  - Psychiatric: normal mood and affect    Data Reviewed:   - labs as above  - thyroid ultrasound report  - thyroid uptake scan report  - pathology report    Assessment/Plan:   1. Graves disease: Had labs drawn on 10/14/19 that showed her TSH was low at 0.01 and FT4 was high at 5.27 confirming she has hyperthyroidism. Was sent for a thyroid ultrasound in 10/19 that showed a large multinodular goiter with 4 nodules in the 2.3-5.1 cm range. Also had a thyroid uptake scan that showed 63.4% uptake consistent with Grave's as there were no hot nodules. Was sent to a thyroid surgeon, Dr. Jimena Daniel and he ordered a biopsy and this came back without any evidence of thyroid cancer in her left nodule that was cold on appearance on the thyroid scan. Was started on methimazole 10 mg bid on 10/26/19 and had repeat labs on 11/29/19 that showed her TSH was 0.00 and FT4 down to 1.29. She currently does not have any compressive symptoms to warrant surgery and since her free T4 has normalized on methimazole, I'm hopeful that we can manage her hyperthyroidism using methimazole and not need surgery or TELLEZ. I also don't think TELLEZ would work given the size of her goiter. She is agreeable to continuing methimazole for now. Given she lives in [place], will minimize her trips to [place] and coordinate her lab draws with her PCP. We spent 60 minutes of face to face time together and > 50% of the time was spent in counseling regarding management of this condition. - cont methimazole 20 mg daily  - check TSH, free T4 and TSH receptor ab in 5 weeks and as needed prior to her next visit in 6 months (gave her a standing order to bring to her PCP's office and ask that her results be faxed to my attention at [phone])        Patient Instructions   1) It appears you have a thyroid condition called Grave's disease that is causing your thyroid to go too fast (hyperthyroidism). We will treat this with methimazole and you will stay on your current dose of 20 mg per day = 2 tabs per day and it's fine to either take 1 twice daily or 2 together once daily. 2) Do this for another 5 weeks and repeat your labs at your PCP's office and have them fax me a copy of the results. Take the lab slip to use as a standing order and I will determine when to repeat your labs again. 3) We'll be in touch with the results to determine how to adjust your dose. 4) Download the [employer] liz from the liz store ([employer]) or Google play store ([employer]) (make sure you allow locations and choose the [facility] portal and choose to receive notifications) so you can communicate with me through the patient portal.  I will send you a message with your lab results. Follow-up and Dispositions    · Return in about 6 months (around 6/5/2020).            Copy sent to:  Dr. Jareth Peralta Fax [phone] (phone: [phone])  Dr. Alessandra Wade phone: [phone], fax: [phone]    Lab follow up: 1/18/20    Sent her the following message in a letter and had [facility] call her with her lab results:    Resulted Orders   T4, FREE (Collected: 1/15/2020 12:00 AM)   Result Value Ref Range    T4, Free 0.87 0.82 - 1.77 ng/dL    Narrative    Performed at:  [address]  [id number]  : Zack Zabala MD, Phone:  [phone]   TSH 3RD GENERATION (Collected: 1/15/2020 12:00 AM)   Result Value Ref Range    TSH 0.010 (L) 0.450 - 4.500 uIU/mL    Narrative    Performed at:  [address]  [id number]  : Zack Zabala MD, Phone:  [phone]   TSH RECEPTOR AB (Collected: 1/15/2020 12:00 AM)   Result Value Ref Range    Thyrotropin Receptor Ab, serum 19.00 (H) 0.00 - 1.75 IU/L    Narrative    Performed at:  [address]  [id number]  : Zack Zabala MD, Phone:  [phone]       Your TSH (thyroid test) is still low at 0.01 but is no longer undetectable and your free T4 has further improved from 5.27 in October to 1.29 in November to 0.87 currently so your current dose of methimazole 20 mg daily is working well. I would like to slightly decrease your dose to 15 mg daily (1.5 of the 10 mg tabs) together ever morning and then go and have your labs repeated again in 2 months in mid-March.  -------------------------------------------------------------------------------------------------------------------  Your TSH receptor antibody, which is a test for Grave's disease, is high at 19. As long as this is elevated over 1.1, we will not be able to stop the medication and will try to get to the lowest dose possible that keeps your levels normal and hopefully in the future will be able to taper you off medication.     Lab follow up: 4/2/20  Received labs from PCP from 3/11/20:  - TSH 0.01  - FT4 0.96  - TRAb 17.1    Sent her daughter, Chester Schulte, the following message through [employer]:    I received your mom's labs dated 3/11/20. Her TSH (thyroid test) is still low at 0.01 which means she remains hyperthyroid but her free T4 remains normal at 0.96 so the methimazole 15 mg dose is working but needs more time to try and normalize her TSH. I recommend staying on 15 mg daily until her visit in July and have her labs repeated again prior to that visit.  -------------------------------------------------------------------------------------------------------------------  Your TSH receptor antibody, which is a test for Grave's disease, is high at 17 but down from 19 at your last check which means your Grave's disease is a little active than at last check. As long as this is elevated over 1.1, we will not be able to stop the medication and will try to get to the lowest dose possible that keeps your levels normal and hopefully in the future will be able to taper you off medication. Lab follow up: 4/27/20  Component      Latest Ref Rng & Units 4/23/2020 4/23/2020 4/23/2020          12:00 AM 12:00 AM 12:00 AM   T4, Free      0.82 - 1.77 ng/dL   0.90   TSH      0.450 - 4.500 uIU/mL  0.031 (L)    Thyrotropin Receptor Ab, serum      0.00 - 1.75 IU/L 14.40 (H)       Sent her daugher, Chester Schulte, the following message through [employer]:    I received your mom's labs dated 4/23/20. I was confused by this as I had recommended not repeating them again until her visit in June. Was there a reason they were drawn now? TSH is a thyroid test.  Your level is 0.03 which is still low and below goal of 0.5-2.0. This test goes opposite of your thyroid dose and suggests you remain hyperthyroid but this has improved from 0.01 last month. Your free T4 is still normal at 0.9 and stable from 0.96 at last check. I still recommend staying on methimazole 15 mg daily to allow more time to normalize the TSH. -------------------------------------------------------------------------------------------------------------------  Your TSH receptor antibody, which is a test for Grave's disease, is high at 14.4 but down from 17 at your last check which means your Grave's disease is less active than at last check. As long as this is elevated over 1.1, we will not be able to stop the medication and will try to get to the lowest dose possible that keeps your levels normal and hopefully in the future will be able to taper you off medication.

## 2019-12-05 NOTE — TELEPHONE ENCOUNTER
Please obtain the fax number for Dr. Bjorn Marti (see my note for the phone number and fax my note there and confirm receipt). Also please fax my note to Dr. Regan Castorena and confirm receipt too. Thanks.

## 2019-12-06 NOTE — TELEPHONE ENCOUNTER
Fax # for Dr. Stephania Sharpe is:  [phone]. Faxed note to both physicians. Confirmed receipt with [facility] in Dr. Kriss Grace office    Confirmed receipt with [facility] in Dr. Judith Rich office.

## 2020-01-16 LAB
T4 FREE SERPL-MCNC: 0.87 NG/DL (ref 0.82–1.77)
TSH RECEP AB SER-ACNC: 19 IU/L (ref 0–1.75)
TSH SERPL DL<=0.005 MIU/L-ACNC: 0.01 UIU/ML (ref 0.45–4.5)

## 2020-01-18 ENCOUNTER — TELEPHONE (OUTPATIENT)
Dept: ENDOCRINOLOGY | Age: 70
End: 2020-01-18

## 2020-01-18 NOTE — TELEPHONE ENCOUNTER
Please call her with her lab results and let her know I sent her the following message in a lab letter: Your TSH (thyroid test) is still low at 0.01 but is no longer undetectable and your free T4 has further improved from 5.27 in October to 1.29 in November to 0.87 currently so your current dose of methimazole 20 mg daily is working well. I would like to slightly decrease your dose to 15 mg daily (1.5 of the 10 mg tabs) together ever morning and then go and have your labs repeated again in 2 months in mid-March.  -------------------------------------------------------------------------------------------------------------------  Your TSH receptor antibody, which is a test for Grave's disease, is high at 19. As long as this is elevated over 1.1, we will not be able to stop the medication and will try to get to the lowest dose possible that keeps your levels normal and hopefully in the future will be able to taper you off medication.

## 2020-01-30 NOTE — TELEPHONE ENCOUNTER
I spoke with Ms. May Jackson and she stated that her mom's pcp did not get the letter regarding the change in her medication and they called her in for review. MsSarah Consuelo Petty took the letter that was sent to her from Dr. May Bella. I informed her that it was electronically sent on 1/18/2020. Ms May Jackson called them while I was on the phone and she stated that he has two offices . The fax # for where all her information should go is [phone]. I informed Ms. May Jackson that her note will be faxed today.

## 2020-01-30 NOTE — TELEPHONE ENCOUNTER
1/30/2020  11:11 AM      Ms. Ke Galan daughter Ciera Dears would like for you to call her regarding her mom lab results and meds    [phone]

## 2020-01-30 NOTE — TELEPHONE ENCOUNTER
I called the called the office where the fax was originally sent and I spoke with Ronnell Quiñones .  I asked her if records are sent to one office what is the process of it being sent to the office where the patient goes. She stated that they have 3 offices and once the records are received  they are scanned into the chart and available for viewing at all locations. I asked her to check Ms. Castano chart for the records that were sent from our office and that we had a verbal confirmed receipt from Silvina Deluca. Ms. Rebecca Anders stated that the records had been received and was scanned into patient chart on 1/3/2020.

## 2020-04-24 ENCOUNTER — TELEPHONE (OUTPATIENT)
Dept: ENDOCRINOLOGY | Age: 70
End: 2020-04-24

## 2020-04-24 NOTE — TELEPHONE ENCOUNTER
----- Message from Aneta Swann sent at 4/24/2020  4:00 PM EDT -----  Regarding: Dr Rebecca Belle first and last name: Whit Irwin with [facility]       Reason for call: they received Lavender sample for the pt they did not see this test or the lab order and would like to know does this particular test need to be added to the order?       Callback required yes/no and why:      Best contact number(s):[phone]      Details to clarify the request:      Aneta Swann

## 2020-04-27 LAB
SPECIMEN STATUS REPORT, ROLRST: NORMAL
SPECIMEN STATUS REPORT, ROLRST: NORMAL
T4 FREE SERPL-MCNC: 0.9 NG/DL (ref 0.82–1.77)
TSH RECEP AB SER-ACNC: 14.4 IU/L (ref 0–1.75)
TSH SERPL DL<=0.005 MIU/L-ACNC: 0.03 UIU/ML (ref 0.45–4.5)

## 2020-06-03 ENCOUNTER — VIRTUAL VISIT (OUTPATIENT)
Dept: ENDOCRINOLOGY | Age: 70
End: 2020-06-03

## 2020-06-03 DIAGNOSIS — E05.00 GRAVES DISEASE: Primary | ICD-10-CM

## 2020-06-03 RX ORDER — HYDRALAZINE HYDROCHLORIDE 100 MG/1
50 TABLET, FILM COATED ORAL AS NEEDED
COMMUNITY
Start: 2020-03-11

## 2020-06-03 RX ORDER — AMLODIPINE BESYLATE 5 MG/1
TABLET ORAL
COMMUNITY
Start: 2020-04-04 | End: 2021-06-18

## 2020-06-03 RX ORDER — ASPIRIN 81 MG/1
81 TABLET ORAL DAILY
COMMUNITY

## 2020-06-03 RX ORDER — ISOSORBIDE MONONITRATE 30 MG/1
TABLET, EXTENDED RELEASE ORAL
COMMUNITY
Start: 2020-05-22

## 2020-06-03 RX ORDER — METOPROLOL TARTRATE 25 MG/1
TABLET, FILM COATED ORAL
COMMUNITY
End: 2020-12-18

## 2020-06-03 NOTE — PATIENT INSTRUCTIONS
1) Go and have your labs drawn this week and I'll be in touch with the results and based on the results I'll determine when I need to have your labs drawn again. 2) Keep taking methimazole 15 mg daily until your labs are back. 3) Please come for a follow up visit on 12/18/20 at 2:30pm in our [place] office.

## 2020-06-03 NOTE — PROGRESS NOTES
Chief Complaint   Patient presents with    Thyroid Problem       **THIS IS A VIRTUAL VISIT VIA A VIDEO SYNCHRONOUS DISCUSSION. PATIENT AGREED TO HAVE THEIR CARE DELIVERED OVER A DOXY. ME VIDEO VISIT IN PLACE OF THEIR REGULARLY SCHEDULED OFFICE VISIT**    History of Present Illness: Kristin Pierre is a 81 y.o. female here for follow up of thyroid. Currently taking MMI 15 mg daily and hasn't missed doses. Went for her cardiology check up and did an EKG that was abnormal and then had a stress test that showed concern for a heart attack at some time in the past and was started on ISMN 30 mg daily. No palpitations. No shortness of breath. No tremors. No heat or cold intolerance. Has had some hair loss. Overall energy is about the same. Appetite is doing better. Her weight is currently 260 up from 247 in 12/19. Plans on going for labs in the next few days. Current Outpatient Medications   Medication Sig    hydrALAZINE (APRESOLINE) 100 mg tablet TAKE 1 TABLET BY MOUTH THREE TIMES DAILY WITH FOOD    metoprolol tartrate (LOPRESSOR) 25 mg tablet metoprolol tartrate 25 mg tablet   1POQD    isosorbide mononitrate ER (IMDUR) 30 mg tablet TAKE 1 TABLET BY MOUTH ONCE DAILY    aspirin delayed-release 81 mg tablet Take  by mouth.  amLODIPine (NORVASC) 5 mg tablet TAKE 1 TABLET BY MOUTH ONCE DAILY    losartan (COZAAR) 100 mg tablet TAKE 1 TABLET BY MOUTH ONCE DAILY. (D C LOSARTAN HCTZ)    methIMAzole (TAPAZOLE) 10 mg tablet Take 15 mg by mouth daily. Dose change 1/18/20--updated med list--did not send prescription to the pharmacy    furosemide (LASIX) 40 mg tablet Take 80 mg by mouth daily.  potassium chloride (K-DUR, KLOR-CON) 20 mEq tablet daily.  dabigatran etexilate (PRADAXA) 150 mg capsule Pradaxa 150 mg capsule   Take 1 capsule twice a day by oral route.  ACETAMINOPHEN (TYLENOL PO) Take  by mouth as needed.  hydrochlorothiazide (MICROZIDE) 12.5 mg capsule Take 12.5 mg by mouth daily.      No current facility-administered medications for this visit. No Known Allergies     Review of Systems: PER HPI    Physical Examination:  - GENERAL: NCAT, Appears well nourished   - EYES: EOMI, non-icteric, no proptosis   - Ear/Nose/Throat: NCAT, no visible inflammation or masses   - CARDIOVASCULAR: no cyanosis, no visible JVD   - RESPIRATORY: respiratory effort normal without any distress or labored breathing   - MUSCULOSKELETAL: Normal ROM of neck and upper extremities observed   - SKIN: No rash on face  - NEUROLOGIC:  No facial asymmetry (Cranial nerve 7 motor function), No gaze palsy   - PSYCHIATRIC: Normal affect, Normal insight and judgement       Data Reviewed:   - none new for review    Assessment/Plan:     1. Graves disease: Had labs drawn on 10/14/19 that showed her TSH was low at 0.01 and FT4 was high at 5.27 confirming she has hyperthyroidism. Was sent for a thyroid ultrasound in 10/19 that showed a large multinodular goiter with 4 nodules in the 2.3-5.1 cm range. Also had a thyroid uptake scan that showed 63.4% uptake consistent with Grave's as there were no hot nodules. Was sent to a thyroid surgeon, Dr. Matthias Arita and he ordered a biopsy and this came back without any evidence of thyroid cancer in her left nodule that was cold on appearance on the thyroid scan. Was started on methimazole 10 mg bid on 10/26/19 and had repeat labs on 11/29/19 that showed her TSH was 0.00 and FT4 down to 1.29. At her first visit with me on 12/5/20, she did not have any compressive symptoms to warrant surgery and since her free T4 had normalized on methimazole, I'm hopeful that we can manage her hyperthyroidism using methimazole and not need surgery or TELLEZ. I also don't think TELLEZ would work given the size of her goiter. She was agreeable to continuing methimazole for now. Repeat labs in 1/20 showed TSH 0.01 and FT4 0.87 and TRAb 19 so decreased her dose to 15 mg daily.   In 3/20, TSH was 0.01, FT4 0.96 and TRAb 17 so kept dose the same. In 4/20, TSH was 0.03, FT4 0.90 and TRAb 14 so kept dose the same. Given she lives in [place], will minimize her trips to [place] and coordinate her lab draws with her PCP. - cont methimazole 15 mg daily  - check TSH, free T4 and TSH receptor ab now and as needed prior to her next visit in 6 months (in 12/19 gave her a standing order to bring to her PCP's office and ask that her results be faxed to my attention at [phone])        Patient Instructions   1) Go and have your labs drawn this week and I'll be in touch with the results and based on the results I'll determine when I need to have your labs drawn again. 2) Keep taking methimazole 15 mg daily until your labs are back. 3) Please come for a follow up visit on 12/18/20 at 2:30pm in our [place] office. Follow-up and Dispositions    · Return for 12/18/20 at 2:30pm.               Copy sent to:  Dr. Laure Lawson phone: [phone], fax: [phone]    Lab follow up: 6/6/20    Component      Latest Ref Rng & Units 6/4/2020 6/4/2020 6/4/2020          12:00 AM 12:00 AM 12:00 AM   T4, Free      0.82 - 1.77 ng/dL   1.00   TSH      0.450 - 4.500 uIU/mL  0.027 (L)    Thyrotropin Receptor Ab, serum      0.00 - 1.75 IU/L 14.60 (H)       Sent her the following message through [employer] and in a letter: Your TSH (thyroid test) remains low at 0.027 and is slightly lower than 0.03 in April. This means your dose of methimazole is not quite enough. I was hoping if we gave the 15 mg dose more time it would normalize your TSH but since this doesn't seem to be happening, I recommend we go back to 20 mg daily = 2 of the 10 mg tabs.   I sent an updated prescription to your local pharmacy.  -------------------------------------------------------------------------------------------------------------------  Your TSH receptor antibody, which is a test for Grave's disease, is high at 14.6 and up slightly from 14.4 at your last check which means your Grave's disease is slightly more active than at last check. As long as this is elevated over 1.1, we will not be able to stop the medication and will try to get to the lowest dose possible that keeps your levels normal and hopefully in the future will be able to taper you off medication.  -------------------------------------------------------------------------------------------------------------------  Plan on repeating your labs again in 2 months and I will send you a reminder to have this drawn. Lab follow up: 10/17/20  Received labs from PCP dated 8/14/20 on 10/12/20:  - TSH 0.832  - FT4 0.57  - TRAb 11    Please let her know I received a copy of her results from August and her TSH (thyroid test) is back to normal at 0.83 so her current dose of methimazole 20 mg daily is working well. Her free T4 has started to go low at 0.57 (normal is 0.82-1.77) so she is now on more methimazole than she needs. I recommend we slightly decrease her dose to 2 tabs on Mon-Fri and 1 tab on Sat and Sun until I see her again in December. I updated your med list but did not send a new prescription to your pharmacy on file that has been filling this med. I recommend she repeat her labs again 1 week prior to her next visit. Will have [facility] call her with her lab results.

## 2020-06-05 LAB
T4 FREE SERPL-MCNC: 1 NG/DL (ref 0.82–1.77)
TSH RECEP AB SER-ACNC: 14.6 IU/L (ref 0–1.75)
TSH SERPL DL<=0.005 MIU/L-ACNC: 0.03 UIU/ML (ref 0.45–4.5)

## 2020-06-06 RX ORDER — METHIMAZOLE 10 MG/1
20 TABLET ORAL DAILY
Qty: 180 TAB | Refills: 3 | Status: SHIPPED | OUTPATIENT
Start: 2020-06-06 | End: 2020-10-17

## 2020-06-09 ENCOUNTER — TELEPHONE (OUTPATIENT)
Dept: ENDOCRINOLOGY | Age: 70
End: 2020-06-09

## 2020-06-09 NOTE — TELEPHONE ENCOUNTER
Please call her with her lab results and let her know I sent her the following message in a lab letter and sent her daughter this message through [employer]:    Your TSH (thyroid test) remains low at 0.027 and is slightly lower than 0.03 in April.  This means your dose of methimazole is not quite enough.  I was hoping if we gave the 15 mg dose more time it would normalize your TSH but since this doesn't seem to be happening, I recommend we go back to 20 mg daily = 2 of the 10 mg tabs.  I sent an updated prescription to your local pharmacy.   -------------------------------------------------------------------------------------------------------------------   Your TSH receptor antibody, which is a test for Grave's disease, is high at 14.6 and up slightly from 14.4 at your last check which means your Grave's disease is slightly more active than at last check. As long as this is elevated over 1.1, we will not be able to stop the medication and will try to get to the lowest dose possible that keeps your levels normal and hopefully in the future will be able to taper you off medication.   -------------------------------------------------------------------------------------------------------------------   Plan on repeating your labs again in 2 months and I will send you a reminder to have this drawn.

## 2020-08-10 ENCOUNTER — TELEPHONE (OUTPATIENT)
Dept: ENDOCRINOLOGY | Age: 70
End: 2020-08-10

## 2020-08-10 NOTE — TELEPHONE ENCOUNTER
I had sent her daughter, Tae Gómez, a [employer] message reminding her to have her mother go and have her labs repeated since it's been 2 months but she hasn't read this yet. Can you call the patient and find out if she has had her labs drawn yet and if so, can we get a copy from her PCP? If not, does she know when she plans on going?

## 2020-10-12 ENCOUNTER — TELEPHONE (OUTPATIENT)
Dept: ENDOCRINOLOGY | Age: 70
End: 2020-10-12

## 2020-10-12 NOTE — TELEPHONE ENCOUNTER
I spoke with patient and she stated that she did have her labs drawn. I informed her that Doreen Foreman been trying to reach her and Dr. ALAS [facility] [place] sent her daughter Kendrick Lopez) a [employer] message. She stated she would call her pcp office and have them fax it. I received a call from [facility] and per Rani Seek she will fax them over. Her phone number for future contact is [phone] and the fax is [phone].

## 2020-10-12 NOTE — TELEPHONE ENCOUNTER
----- Message from Blanche Pinto sent at 10/12/2020 10:22 AM EDT -----  Regarding: Dr Elizabeth Vidales is returning [place] call from Friday, please call pt back at [phone]

## 2020-10-17 ENCOUNTER — TELEPHONE (OUTPATIENT)
Dept: ENDOCRINOLOGY | Age: 70
End: 2020-10-17

## 2020-10-17 RX ORDER — METHIMAZOLE 10 MG/1
TABLET ORAL
Qty: 180 TAB | Refills: 3
Start: 2020-10-17 | End: 2020-12-18

## 2020-10-17 NOTE — TELEPHONE ENCOUNTER
Please call her with her lab results:    Please let her know I received a copy of her results from August and her TSH (thyroid test) is back to normal at 0.83 so her current dose of methimazole 20 mg daily is working well. Her free T4 has started to go low at 0.57 (normal is 0.82-1.77) so she is now on more methimazole than she needs. I recommend we slightly decrease her dose to 2 tabs on Mon-Fri and 1 tab on Sat and Sun until I see her again in December. I updated your med list but did not send a new prescription to your pharmacy on file that has been filling this med. I recommend she repeat her labs again 1 week prior to her next visit.

## 2020-12-18 ENCOUNTER — VIRTUAL VISIT (OUTPATIENT)
Dept: ENDOCRINOLOGY | Age: 70
End: 2020-12-18
Payer: MEDICARE

## 2020-12-18 DIAGNOSIS — E05.00 GRAVES DISEASE: Primary | ICD-10-CM

## 2020-12-18 PROCEDURE — 99442 PR PHYS/QHP TELEPHONE EVALUATION 11-20 MIN: CPT | Performed by: INTERNAL MEDICINE

## 2020-12-18 RX ORDER — ATORVASTATIN CALCIUM 40 MG/1
TABLET, FILM COATED ORAL
COMMUNITY
Start: 2020-11-30

## 2020-12-18 RX ORDER — METHIMAZOLE 10 MG/1
TABLET ORAL
Qty: 135 TAB | Refills: 3 | Status: SHIPPED | OUTPATIENT
Start: 2020-12-18 | End: 2021-06-18

## 2020-12-18 NOTE — PROGRESS NOTES
Chief Complaint   Patient presents with    Thyroid Problem     phone call [phone]    Other     pcp is Laureen Hodgkins at [address] and pharmacy confirmed       **THIS IS A VIRTUAL VISIT VIA A TELEPHONE ENCOUNTER. PATIENT AGREED TO HAVE THEIR CARE DELIVERED OVER THE PHONE IN PLACE OF THEIR REGULARLY SCHEDULED OFFICE VISIT**    History of Present Illness: Maricel Ochoa is a 78 y.o. female here for follow up of thyroid. Has been compliant with MMI 20 mg M-F and 10 mg Sat-Sun since October. Has been off the metoprolol since the summer as she was having bradycardia but off this is not having any chest pain or palpitations. No heat or cold intolerance. Bowels are mostly regular and occ takes miralax. Weight was 260 in 6/20 and did go down to 230 but is back up to 250 currently. Current Outpatient Medications   Medication Sig    atorvastatin (LIPITOR) 40 mg tablet TAKE 1 TABLET BY MOUTH AT BEDTIME    methIMAzole (TAPAZOLE) 10 mg tablet Take 2 tabs on Mon-Fri and 1 tab on Sat-Sun--Dose change 10/17/20--updated med list--did not send prescription to the pharmacy    hydrALAZINE (APRESOLINE) 100 mg tablet TAKE 1 TABLET BY MOUTH THREE TIMES DAILY WITH FOOD    isosorbide mononitrate ER (IMDUR) 30 mg tablet TAKE 1 TABLET BY MOUTH ONCE DAILY    aspirin delayed-release 81 mg tablet Take  by mouth.  amLODIPine (NORVASC) 5 mg tablet TAKE 1 TABLET BY MOUTH ONCE DAILY    losartan (COZAAR) 100 mg tablet TAKE 1 TABLET BY MOUTH ONCE DAILY. (D C LOSARTAN HCTZ)    furosemide (LASIX) 40 mg tablet Take 80 mg by mouth daily.  potassium chloride (K-DUR, KLOR-CON) 20 mEq tablet daily.  dabigatran etexilate (PRADAXA) 150 mg capsule Pradaxa 150 mg capsule   Take 1 capsule twice a day by oral route.  ACETAMINOPHEN (TYLENOL PO) Take  by mouth as needed.  hydrochlorothiazide (MICROZIDE) 12.5 mg capsule Take 12.5 mg by mouth daily.      No current facility-administered medications for this visit. No Known Allergies     Review of Systems: PER HPI    Physical Examination: NOT PERFORMED DUE TO THIS BEING A TELEPHONE CALL      Data Reviewed:   - TSH 2.54  - FT4 0.86  - TRAb 7.95    Assessment/Plan:     1. Graves disease: Had labs drawn on 10/14/19 that showed her TSH was low at 0.01 and FT4 was high at 5.27 confirming she has hyperthyroidism. Was sent for a thyroid ultrasound in 10/19 that showed a large multinodular goiter with 4 nodules in the 2.3-5.1 cm range. Also had a thyroid uptake scan that showed 63.4% uptake consistent with Grave's as there were no hot nodules. Was sent to a thyroid surgeon, Dr. Therese Gee and he ordered a biopsy and this came back without any evidence of thyroid cancer in her left nodule that was cold on appearance on the thyroid scan. Was started on methimazole 10 mg bid on 10/26/19 and had repeat labs on 11/29/19 that showed her TSH was 0.00 and FT4 down to 1.29. At her first visit with me on 12/5/19, she did not have any compressive symptoms to warrant surgery and since her free T4 had normalized on methimazole, I'm hopeful that we can manage her hyperthyroidism using methimazole and not need surgery or TELLEZ. I also don't think TELLEZ would work given the size of her goiter. She was agreeable to continuing methimazole for now. Repeat labs in 1/20 showed TSH 0.01 and FT4 0.87 and TRAb 19 so decreased her dose to 15 mg daily. In 3/20, TSH was 0.01, FT4 0.96 and TRAb 17 so kept dose the same. In 4/20, TSH was 0.03, FT4 0.90 and TRAb 14 so kept dose the same. TSH 0.027 and TRAb 14.6 in 6/20 so increased to 20 mg daily. TSH 0.83 and TRAb 11 in 8/20 but didn't receive the results from PCP until 10/20 and decreased her dose to 20 mg on Mon-Fri and 10 mg on Sat-Sun and TSH was 2.54 and TRAb 7.95 in 12/20 so decreased to 15 mg daily. Given she lives in [place], will minimize her trips to [place] and coordinate her lab draws with her PCP.     - decrease methimazole to 15 mg daily  - check TSH, free T4 and TSH receptor ab in 3 months and as needed prior to her next visit in 6 months (in 12/19 gave her a standing order to bring to her PCP's office and ask that her results be faxed to my attention at [phone])        Follow-up and Dispositions    · Return 6/18/21 at 2:30pm.         We spent 16 minutes of total time together during this virtual visit with a telephone encounter.   All questions were answered    Copy sent to:  Dr. Donovan Peace phone: [phone], fax: [phone]

## 2021-06-18 ENCOUNTER — OFFICE VISIT (OUTPATIENT)
Dept: ENDOCRINOLOGY | Age: 71
End: 2021-06-18
Payer: MEDICARE

## 2021-06-18 VITALS
WEIGHT: 293 LBS | HEIGHT: 64 IN | DIASTOLIC BLOOD PRESSURE: 71 MMHG | BODY MASS INDEX: 50.02 KG/M2 | HEART RATE: 81 BPM | SYSTOLIC BLOOD PRESSURE: 120 MMHG

## 2021-06-18 DIAGNOSIS — E05.00 GRAVES DISEASE: Primary | ICD-10-CM

## 2021-06-18 PROCEDURE — G8417 CALC BMI ABV UP PARAM F/U: HCPCS | Performed by: INTERNAL MEDICINE

## 2021-06-18 PROCEDURE — G8432 DEP SCR NOT DOC, RNG: HCPCS | Performed by: INTERNAL MEDICINE

## 2021-06-18 PROCEDURE — G8536 NO DOC ELDER MAL SCRN: HCPCS | Performed by: INTERNAL MEDICINE

## 2021-06-18 PROCEDURE — G8427 DOCREV CUR MEDS BY ELIG CLIN: HCPCS | Performed by: INTERNAL MEDICINE

## 2021-06-18 PROCEDURE — 1101F PT FALLS ASSESS-DOCD LE1/YR: CPT | Performed by: INTERNAL MEDICINE

## 2021-06-18 PROCEDURE — G8400 PT W/DXA NO RESULTS DOC: HCPCS | Performed by: INTERNAL MEDICINE

## 2021-06-18 PROCEDURE — 99214 OFFICE O/P EST MOD 30 MIN: CPT | Performed by: INTERNAL MEDICINE

## 2021-06-18 PROCEDURE — 1090F PRES/ABSN URINE INCON ASSESS: CPT | Performed by: INTERNAL MEDICINE

## 2021-06-18 PROCEDURE — 3017F COLORECTAL CA SCREEN DOC REV: CPT | Performed by: INTERNAL MEDICINE

## 2021-06-18 RX ORDER — CHLORPHENIRAMINE MALEATE 4 MG
TABLET ORAL
COMMUNITY
Start: 2021-06-09 | End: 2022-04-15

## 2021-06-18 RX ORDER — RIVAROXABAN 20 MG/1
TABLET, FILM COATED ORAL
COMMUNITY
Start: 2021-06-09

## 2021-06-18 RX ORDER — METHIMAZOLE 10 MG/1
TABLET ORAL
Qty: 180 TABLET | Refills: 3 | Status: SHIPPED | OUTPATIENT
Start: 2021-06-18 | End: 2021-08-19

## 2021-06-18 NOTE — PATIENT INSTRUCTIONS
1) TSH is a thyroid test.  Your level is 0.01 which is low again and below goal of 0.45-2.0. This test goes opposite of your thyroid dose and suggests your dose of methimazole is not enough and you have become hyperthyroid again. I will increase your dose back to 20 mg = 2 of the 10 mg tabs everyday for the next 2 months and have sent a new prescription to your local pharmacy. 2) Have your PCP check your labs in 2 months around August 18th and make sure I get a copy. Based on the results, I'll determine when I need your next check.

## 2021-06-18 NOTE — PROGRESS NOTES
Chief Complaint   Patient presents with    Thyroid Problem     History of Present Illness: Boris Cuevas is a 78 y.o. female here for follow up of thyroid. Weight up from 250 in 12/20 to 311 lbs currently. Has been taking methimazole 15 mg daily. Forgot that she was supposed to have labs with me in March and as a result has stayed on her current dose for 6 months. No chest pain or palpitations or tremors. No shortness of breath. No heat or cold intolerance. Bowels are occasionally constipated. Some hair loss but better than before. No anxiety or trouble sleeping. Has felt more tired. Has stayed very hungry the past few months and this has led to the weight gain. Current Outpatient Medications   Medication Sig    Xarelto 20 mg tab tablet TAKE 1 TABLET BY MOUTH ONCE DAILY WITH EVENING MEAL    atorvastatin (LIPITOR) 40 mg tablet TAKE 1 TABLET BY MOUTH AT BEDTIME    methIMAzole (TAPAZOLE) 10 mg tablet Take 1.5 tabs daily--New lower dose replaces prior script on file    hydrALAZINE (APRESOLINE) 100 mg tablet TAKE 1 TABLET BY MOUTH THREE TIMES DAILY WITH FOOD    isosorbide mononitrate ER (IMDUR) 30 mg tablet TAKE 1 TABLET BY MOUTH ONCE DAILY    aspirin delayed-release 81 mg tablet Take  by mouth.  losartan (COZAAR) 100 mg tablet TAKE 1 TABLET BY MOUTH ONCE DAILY. (D C LOSARTAN HCTZ)    furosemide (LASIX) 40 mg tablet Take 40-80 mg by mouth daily.  potassium chloride (K-DUR, KLOR-CON) 20 mEq tablet daily.  clotrimazole (LOTRIMIN) 1 % topical cream APPLY THE AFFECTED AND SURROUNDING AREAS OF SKIN TOPICALLY TWICE DAILY IN THE MORNING AND EVENING.  ACETAMINOPHEN (TYLENOL PO) Take  by mouth as needed. No current facility-administered medications for this visit. No Known Allergies     Review of Systems: PER HPI    Physical Examination:  Blood pressure 120/71, pulse 81, height 5' 4\" (1.626 m), weight 311 lb (141.1 kg).   - General: pleasant, no distress, good eye contact   - Neck: (+) large bulky nodular goiter without thyroid bruits  - Cardiovascular: irregular, normal rate, nl s1 and s2, no m/r/g   - Respiratory: clear to auscultation bilaterally   - Integumentary: skin is normal, (+) lymphedema of right leg  - Neurological: reflexes 2+ at biceps, no tremors  - Psychiatric: normal mood and affect    Data Reviewed: 6/9/21  - TSH 0.014  - FT4 1.16  - TSH receptor ab 12.7    Assessment/Plan:     1. Graves disease: Had labs drawn on 10/14/19 that showed her TSH was low at 0.01 and FT4 was high at 5.27 confirming she has hyperthyroidism. Was sent for a thyroid ultrasound in 10/19 that showed a large multinodular goiter with 4 nodules in the 2.3-5.1 cm range. Also had a thyroid uptake scan that showed 63.4% uptake consistent with Grave's as there were no hot nodules. Was sent to a thyroid surgeon, Dr. Carisa Baltazar and he ordered a biopsy and this came back without any evidence of thyroid cancer in her left nodule that was cold on appearance on the thyroid scan. Was started on methimazole 10 mg bid on 10/26/19 and had repeat labs on 11/29/19 that showed her TSH was 0.00 and FT4 down to 1.29. At her first visit with me on 12/5/19, she did not have any compressive symptoms to warrant surgery and since her free T4 had normalized on methimazole, I'm hopeful that we can manage her hyperthyroidism using methimazole and not need surgery or TELLEZ. I also don't think TELLEZ would work given the size of her goiter. She was agreeable to continuing methimazole for now. Repeat labs in 1/20 showed TSH 0.01 and FT4 0.87 and TRAb 19 so decreased her dose to 15 mg daily. In 3/20, TSH was 0.01, FT4 0.96 and TRAb 17 so kept dose the same. In 4/20, TSH was 0.03, FT4 0.90 and TRAb 14 so kept dose the same. TSH 0.027 and TRAb 14.6 in 6/20 so increased to 20 mg daily.   TSH 0.83 and TRAb 11 in 8/20 but didn't receive the results from PCP until 10/20 and decreased her dose to 20 mg on Mon-Fri and 10 mg on Sat-Sun and TSH was 2.54 and TRAb 7.95 in 12/20 so decreased to 15 mg daily but TSH down to 0.014 and TRAb up to 12.7 in 6/21 so increased back to 20 mg daily. Given she lives in [place], will minimize her trips to [place] and coordinate her lab draws with her PCP. - increase methimazole to 20 mg daily  - check TSH, free T4 and TSH receptor ab in 2 months and as needed prior to her next visit in 6 months (in 12/19 gave her a standing order to bring to her PCP's office and ask that her results be faxed to my attention at [phone])        Patient Instructions   1) TSH is a thyroid test.  Your level is 0.01 which is low again and below goal of 0.45-2.0. This test goes opposite of your thyroid dose and suggests your dose of methimazole is not enough and you have become hyperthyroid again. I will increase your dose back to 20 mg = 2 of the 10 mg tabs everyday for the next 2 months and have sent a new prescription to your local pharmacy. 2) Have your PCP check your labs in 2 months around August 18th and make sure I get a copy. Based on the results, I'll determine when I need your next check. Follow-up and Dispositions    · Return in about 6 months (around 12/18/2021). Copy sent to:  Dr. Esdras Estevez phone: [phone], fax: [phone]    Lab follow up: 8/19/21  Received labs from PCP from 8/12/21:  - TSH 2.97  - FT4 1.23  - TRAb < 1.1    Sent her the following message through [employer] and had [facility]u call her with her lab results:  TSH is a thyroid test.  Your level is 2.97 which is normal but just above goal of 0.45-2.0. This test goes opposite of your thyroid dose and suggests your dose of methimazole is working well but slightly more than you need. I will decrease your dose to 2 tabs on Mon-Sat and none on Sunday. I updated your med list but did not send a new prescription to your pharmacy on file that has been filling this med.   Please have your labs repeated again in 2 months around November 15th and make sure I get a copy. Lab follow up: 11/18/21  Received labs from PCP from 11/16/21:  - TSH 0.010  - FT4 1.54  - TRAb 12.5    TSH is a thyroid test.  Your level is 0.01 which is low again and below goal of 0.45-2.0 and your free T4 is normal at 1.54 but up from 1.23. This test goes opposite of your thyroid dose and suggests your dose of methimazole is now not enough. I will increase your dose of methimazole back to 2 tabs everyday. Your TSH receptor antibody, which is a test for Grave's disease, was undetectable in August and now is high again at 12.5 so your Grave's disease has become more active on the lower dose of methimazole. Have your labs repeated by your PCP again prior to your visit in January.

## 2021-07-29 ENCOUNTER — TELEPHONE (OUTPATIENT)
Dept: ENDOCRINOLOGY | Age: 71
End: 2021-07-29

## 2021-07-29 NOTE — TELEPHONE ENCOUNTER
----- Message from Manan Gil sent at 7/29/2021  8:15 AM EDT -----  Regarding: Dr. Thomas Urban: [phone]  General Message/Vendor Calls    Caller's first and last name:Pt      Reason for call:Pt would like to speak to Dr. Toy Rodriguez nurse soon as it is important.        Callback required yes/no and why:Yes, discuss      Best contact number(s):[phone]      Details to clarify the request:n/a      Manan Gil

## 2021-07-29 NOTE — TELEPHONE ENCOUNTER
I returned this call and Ms. Vicente Omero wanted to know if Dr. ALAS [facility] had said anything on her last visit about her getting a EKG because she said she saw her heart doctor yesterday and they told her her heart was good. I looked at her last visit and let her know that I did not see anything regarding a EKG. She said she just wanted to make sure everyone was on the same page.   Vanetta Sicard

## 2021-08-19 ENCOUNTER — TELEPHONE (OUTPATIENT)
Dept: ENDOCRINOLOGY | Age: 71
End: 2021-08-19

## 2021-08-19 RX ORDER — METHIMAZOLE 10 MG/1
TABLET ORAL
Qty: 180 TABLET | Refills: 3
Start: 2021-08-19 | End: 2021-11-18

## 2021-08-19 NOTE — TELEPHONE ENCOUNTER
Please call her with her lab results and let her know I sent her daughter, Candace Meyers, the following message through [employer]:    TSH is a thyroid test.  Your level is 2.97 which is normal but just above goal of 0.45-2.0. This test goes opposite of your thyroid dose and suggests your dose of methimazole is working well but slightly more than you need. I will decrease your dose to 2 tabs on Mon-Sat and none on Sunday. I updated your med list but did not send a new prescription to your pharmacy on file that has been filling this med. Please have your labs repeated again in 2 months around November 15th and make sure I get a copy.

## 2021-11-18 ENCOUNTER — TELEPHONE (OUTPATIENT)
Dept: ENDOCRINOLOGY | Age: 71
End: 2021-11-18

## 2021-11-18 RX ORDER — METHIMAZOLE 10 MG/1
TABLET ORAL
Qty: 180 TABLET | Refills: 3
Start: 2021-11-18 | End: 2021-12-01 | Stop reason: SDUPTHER

## 2021-11-19 NOTE — TELEPHONE ENCOUNTER
Please call her with her lab results:    TSH is a thyroid test.  Your level is 0.01 which is low again and below goal of 0.45-2.0 and your free T4 is normal at 1.54 but up from 1.23. This test goes opposite of your thyroid dose and suggests your dose of methimazole is now not enough. I will increase your dose of methimazole back to 2 tabs everyday. Your TSH receptor antibody, which is a test for Grave's disease, was undetectable in August and now is high again at 12.5 so your Grave's disease has become more active on the lower dose of methimazole. Have your labs repeated by your PCP again prior to your visit in January.

## 2021-11-22 NOTE — TELEPHONE ENCOUNTER
----- Message from Conrado Mcdowell sent at 11/22/2021 10:45 AM EST -----  Regarding: /Telephone  Patient return call    Caller's first and last name and relationship (if not the patient): n/a      Best contact number(s): [phone]      Whose call is being returned: Ms. Cam      Details to clarify the request: n/a      Conrado Mcdowell

## 2021-11-22 NOTE — TELEPHONE ENCOUNTER
----- Message from Ata Petit sent at 11/22/2021  3:10 PM EST -----  Regarding: Dr. Karlie Morse  Patient return call    Caller's first and last name and relationship (if not the patient):      Best contact number(s): [phone]      Whose call is being returned: Aleks Oneill      Details to clarify the request: Call the patient. Please call the patient twice to give her time to get to the phone.        Ata Petit

## 2021-11-22 NOTE — TELEPHONE ENCOUNTER
----- Message from Avani Cruz sent at 11/19/2021  3:56 PM EST -----  Regarding: Naomi Huff MD  Patient return call    Caller's first and last name and relationship (if not the patient):      Best contact number(s):[phone]      Whose call is being returned:arielle      Heike to clarify the request:patient returning call      Avani Cruz

## 2021-12-01 RX ORDER — METHIMAZOLE 10 MG/1
TABLET ORAL
Qty: 180 TABLET | Refills: 3 | Status: SHIPPED | OUTPATIENT
Start: 2021-12-01 | End: 2022-04-21 | Stop reason: SDUPTHER

## 2021-12-01 NOTE — TELEPHONE ENCOUNTER
Patient needs a new script sent to her pharmacy for Methimazole two tablets daily. I informed her that it will be sent in today.

## 2021-12-01 NOTE — TELEPHONE ENCOUNTER
Client called and stated that when she went to get her prescription filled at [facility] in [facility]  that they have the wrong dosage on the meds- she would like us to call and get the dosage corrected- thanks

## 2022-01-07 ENCOUNTER — TELEPHONE (OUTPATIENT)
Dept: ENDOCRINOLOGY | Age: 72
End: 2022-01-07

## 2022-01-07 ENCOUNTER — VIRTUAL VISIT (OUTPATIENT)
Dept: ENDOCRINOLOGY | Age: 72
End: 2022-01-07
Payer: MEDICARE

## 2022-01-07 DIAGNOSIS — E05.00 GRAVES DISEASE: Primary | ICD-10-CM

## 2022-01-07 PROCEDURE — 99443 PR PHYS/QHP TELEPHONE EVALUATION 21-30 MIN: CPT | Performed by: INTERNAL MEDICINE

## 2022-01-07 NOTE — PATIENT INSTRUCTIONS
1) Please have your appointment with your cardiologist at the end of January. If he feels you are at an acceptable risk to proceed with surgery, then make an appointment with Dr. Francia Chung to be evaluated to see if he is agreeable to doing the surgery and if he feels comfortable doing the surgery, then schedule a surgery date and let me know when this will be. If you are not an acceptable surgical candidate, then we will need to continue indefinite methimazole. 2) Please increase your methimazole to 25 mg daily = 2.5 of the 10 mg tabs daily. I updated your prescription at the pharmacy for this dose. 3) Please let me know what your cardiologist says and have them fax me a note to [phone]. 4) We will determine the timing of a follow up visit based on whether you have surgery or not.

## 2022-01-07 NOTE — LETTER
1/7/2022 5:42 PM    Ms. Tavares Leon [facility] [phone]    1) Please have your appointment with your cardiologist at the end of January. If he feels you are at an acceptable risk to proceed with surgery, then make an appointment with Dr. Ella Moore to be evaluated to see if he is agreeable to doing the surgery and if he feels comfortable doing the surgery, then schedule a surgery date and let me know when this will be. If you are not an acceptable surgical candidate, then we will need to continue indefinite methimazole. 2) Please increase your methimazole to 25 mg daily = 2.5 of the 10 mg tabs daily. I updated your prescription at the pharmacy for this dose. 3) Please let me know what your cardiologist says and have them fax me a note to [phone]. 4) We will determine the timing of a follow up visit based on whether you have surgery or not.           Sincerely,      Tameka England MD

## 2022-01-07 NOTE — TELEPHONE ENCOUNTER
Please call her cardiologist's office at [phone] and obtain their fax number and fax my office note to him.     Please also fax a copy to her PCP Dr. Chanel Brooks fax: [phone]

## 2022-01-07 NOTE — PROGRESS NOTES
Chief Complaint   Patient presents with    Thyroid Problem     [phone]    Other     pcp is Sacha Ramirez and pharmacy confirmed       **THIS IS A VIRTUAL VISIT VIA A TELEPHONE ENCOUNTER. PATIENT AGREED TO HAVE THEIR CARE DELIVERED OVER THE PHONE IN PLACE OF THEIR REGULARLY SCHEDULED OFFICE VISIT**    History of Present Illness: Cecilia Noble is a 73 y.o. female here for follow up of thyroid. Has been taking methimazole 20 mg daily and hasn't missed any doses. No chest pain or palpitations. No shortness of breath. No tremors. Bowels are regular. No heat or cold intolerance but occ may feel hot. Overall energy can vary and has been more tired the past 6 weeks. Did have an ER visit before [place] for back pain. No dysphagia, dyspnea when supine, or hoarseness. Her daughter, Hunter Sun, is on the call with us, and she feels her mother's neck does appear larger over the past 2 years. Weight is currently 309 lbs down from 311 lbs in 6/21. Due to see her cardiologist, Dr. Paredes Sessions, later this month. She is willing to consider total thyroidectomy for definitive treatment provided her cardiologist feels she is at an acceptable risk for this surgery. Current Outpatient Medications   Medication Sig    methIMAzole (TAPAZOLE) 10 mg tablet Take 2 tabs daily    Xarelto 20 mg tab tablet TAKE 1 TABLET BY MOUTH ONCE DAILY WITH EVENING MEAL    clotrimazole (LOTRIMIN) 1 % topical cream APPLY THE AFFECTED AND SURROUNDING AREAS OF SKIN TOPICALLY TWICE DAILY IN THE MORNING AND EVENING.  atorvastatin (LIPITOR) 40 mg tablet TAKE 1 TABLET BY MOUTH AT BEDTIME    hydrALAZINE (APRESOLINE) 100 mg tablet TAKE 1 TABLET BY MOUTH THREE TIMES DAILY WITH FOOD    isosorbide mononitrate ER (IMDUR) 30 mg tablet TAKE 1 TABLET BY MOUTH ONCE DAILY    aspirin delayed-release 81 mg tablet Take  by mouth.     losartan (COZAAR) 100 mg tablet TAKE 1 TABLET BY MOUTH ONCE DAILY. (D C LOSARTAN HCTZ)    furosemide (LASIX) 40 mg tablet Take 40-80 mg by mouth daily.  potassium chloride (K-DUR, KLOR-CON) 20 mEq tablet daily.  ACETAMINOPHEN (TYLENOL PO) Take  by mouth as needed. No current facility-administered medications for this visit. No Known Allergies     Review of Systems: PER HPI    Physical Examination: NOT PERFORMED DUE TO THIS BEING A TELEPHONE CALL      Data Reviewed:   - TSH 0.008  - FT4 1.45    Assessment/Plan:     1. Graves disease: Had labs drawn on 10/14/19 that showed her TSH was low at 0.01 and FT4 was high at 5.27 confirming she has hyperthyroidism. Was sent for a thyroid ultrasound in 10/19 that showed a large multinodular goiter with 4 nodules in the 2.3-5.1 cm range. Also had a thyroid uptake scan that showed 63.4% uptake consistent with Grave's as there were no hot nodules. Was sent to a thyroid surgeon, Dr. Kalyan Ware and he ordered a biopsy and this came back without any evidence of thyroid cancer in her left nodule that was cold on appearance on the thyroid scan. Was started on methimazole 10 mg bid on 10/26/19 and had repeat labs on 11/29/19 that showed her TSH was 0.00 and FT4 down to 1.29. At her first visit with me on 12/5/19, she did not have any compressive symptoms to warrant surgery and since her free T4 had normalized on methimazole, I'm hopeful that we can manage her hyperthyroidism using methimazole and not need surgery or TELLEZ. I also don't think TELLEZ would work given the size of her goiter. She was agreeable to continuing methimazole for now. Repeat labs in 1/20 showed TSH 0.01 and FT4 0.87 and TRAb 19 so decreased her dose to 15 mg daily. In 3/20, TSH was 0.01, FT4 0.96 and TRAb 17 so kept dose the same. In 4/20, TSH was 0.03, FT4 0.90 and TRAb 14 so kept dose the same. TSH 0.027 and TRAb 14.6 in 6/20 so increased to 20 mg daily.   TSH 0.83 and TRAb 11 in 8/20 but didn't receive the results from PCP until 10/20 and decreased her dose to 20 mg on Mon-Fri and 10 mg on Sat-Sun and TSH was 2.54 and TRAb 7.95 in 12/20 so decreased to 15 mg daily but TSH down to 0.014 and TRAb up to 12.7 in 6/21 so increased back to 20 mg daily. TSH 2.97 and TRAb < 1.1 in 8/21 so decreased to 20 mg 6x/week and TSH 0.01 and TRAb 12.5 in 11/21 so increased back to 2 tabs daily. TSH 0.008 and FT4 1.45 in 1/21 so increased to 2.5 tabs daily=25 mg daily. Given she has been on treatment for 2 years and has not been able to go into remission and her goiter is enlarging, I feel it's reasonable to consider total thyroidectomy for definitive treatment provided she is at acceptable risk per her cardiologist, Dr. Leticia Escobar, whom she is seeing on 1/28/22. If he feels she can proceed with surgery, then I recommend that she make a follow up visit with Dr. Silver Vega to discuss this risks and benefits of surgery and if he is willing to proceed, then she should have total thyroidectomy. If her cardiologist and/or Dr. Silver Vega do not feel that she is at an acceptable risk for surgery then she will continue on high dose methimazole indefinitely. Given she lives in [place], will minimize her trips to [place] and coordinate her lab draws with her PCP. - increase methimazole to 25 mg daily  - check TSH, free T4 and TSH receptor ab in 2 months and as needed prior to her next visit in 6 months (in 12/19 gave her a standing order to bring to her PCP's office and ask that her results be faxed to my attention at [phone])        Patient Instructions   1) Please have your appointment with your cardiologist at the end of January. If he feels you are at an acceptable risk to proceed with surgery, then make an appointment with Dr. Silver Vega to be evaluated to see if he is agreeable to doing the surgery and if he feels comfortable doing the surgery, then schedule a surgery date and let me know when this will be. If you are not an acceptable surgical candidate, then we will need to continue indefinite methimazole.     2) Please increase your methimazole to 25 mg daily = 2.5 of the 10 mg tabs daily. I updated your prescription at the pharmacy for this dose. 3) Please let me know what your cardiologist says and have them fax me a note to [phone]. 4) We will determine the timing of a follow up visit based on whether you have surgery or not. Follow-up and Dispositions    · Return to be determined based on whether she has surgery. I spent a total of 35 minutes on the day of this virtual visit with a telephone encounter. All questions were answered and a copy of the instructions were sent to her via a letter.       Copy sent to:  Dr. Shefali Gonzalez phone: [phone], fax: [phone]  Dr. Hansa Hopson Sessions [phone] (phone)

## 2022-01-24 ENCOUNTER — TELEPHONE (OUTPATIENT)
Dept: ENDOCRINOLOGY | Age: 72
End: 2022-01-24

## 2022-01-24 NOTE — TELEPHONE ENCOUNTER
1/24/2022  8:35 AM  Patient called to have nurse call her back concerning some information that she needs faxed to her other doctor. She wouldn't go into details with me.  But please call her back at [phone] thanks

## 2022-01-24 NOTE — TELEPHONE ENCOUNTER
I reprinted the note. Please refax to her PCP's office and see if they have a different preferred fax than is what is in my note.

## 2022-01-24 NOTE — TELEPHONE ENCOUNTER
I spoke with patient and she stated that she spoke with someone in her PCP's office and they still state they have not received her office note.   I informed her that I would re-fax it

## 2022-01-25 NOTE — TELEPHONE ENCOUNTER
I spoke with Fly Diaz at Dr Wilfredo Medellin office and she gave me a different fax # @ [phone]      Note faxed to Gayla Hassan (nurse) and confirmation pending.

## 2022-02-25 ENCOUNTER — TELEPHONE (OUTPATIENT)
Dept: ENDOCRINOLOGY | Age: 72
End: 2022-02-25

## 2022-02-25 NOTE — TELEPHONE ENCOUNTER
2/25/2022  4:46 PM    Daughter gordon clay returning your call, please contact her back at [phone] thanks

## 2022-02-25 NOTE — TELEPHONE ENCOUNTER
2/25/2022  10:54 AM    Good Morning Luz,    Patients daughter Hunter Sun would like for you to give her a call. She would like to provide you with information needing to be shared with Dr. Elysa Koyanagi. Mrs. Lisandra Chi can be reached at [phone].     Thanks  Andrea Ramirez

## 2022-02-26 NOTE — TELEPHONE ENCOUNTER
Please let her know I received notes from her mother's PCP and cardiologist giving clearance for thyroid surgery. Find out if her mother has made an appointment yet with the surgeon, Dr. Skip Lerner, to discuss surgery and if so, will she be proceeding with this or not? If she does end up having surgery, please let me know when it will be so I can discuss getting her a follow up appointment after surgery and starting her on thyroid hormone replacement after surgery.

## 2022-02-28 NOTE — TELEPHONE ENCOUNTER
Pooja Gallo stated that she and her mom would like to know if there is a surgeon within [address]  That Dr. Manuel Perry can recommend who can do the procedure. She stated she would like all her records in one place so that it is easier for her records to be reviewed. Des Gallardo

## 2022-02-28 NOTE — TELEPHONE ENCOUNTER
My recommendation for an excellent thyroid surgery with [facility] is Dr. Naya Mohan with [facility] at [phone]. They are located in [address] [facility] [address] on the 2nd floor, [address]. If she ends up seeing him for surgery, then I can review all his notes.

## 2022-03-18 PROBLEM — E66.01 OBESITY, MORBID (HCC): Status: ACTIVE | Noted: 2019-12-05

## 2022-03-20 PROBLEM — K56.609 SMALL BOWEL OBSTRUCTION (HCC): Status: ACTIVE | Noted: 2017-01-16

## 2022-03-21 ENCOUNTER — OFFICE VISIT (OUTPATIENT)
Dept: SURGERY | Age: 72
End: 2022-03-21
Payer: COMMERCIAL

## 2022-03-21 VITALS
HEIGHT: 64 IN | SYSTOLIC BLOOD PRESSURE: 120 MMHG | RESPIRATION RATE: 18 BRPM | OXYGEN SATURATION: 100 % | BODY MASS INDEX: 49.34 KG/M2 | WEIGHT: 289 LBS | TEMPERATURE: 97.3 F | HEART RATE: 90 BPM | DIASTOLIC BLOOD PRESSURE: 82 MMHG

## 2022-03-21 DIAGNOSIS — E66.01 OBESITY, MORBID (HCC): ICD-10-CM

## 2022-03-21 DIAGNOSIS — E05.00 GRAVES DISEASE: Primary | ICD-10-CM

## 2022-03-21 DIAGNOSIS — E04.2 MULTINODULAR GOITER: ICD-10-CM

## 2022-03-21 PROCEDURE — 99205 OFFICE O/P NEW HI 60 MIN: CPT | Performed by: SURGERY

## 2022-03-21 NOTE — PROGRESS NOTES
HISTORY OF PRESENT ILLNESS  Zita Pickens is a 73 y.o. female who comes in for consultation by Dr Mariana Schmitz for Graves disease  HPI  She has been dealing with Graves disease and a goiter for several years. She has been on methimazole since 10/2019 but has not gone into remission and her thyroid is getting larger. She has difficulty with weight control and fatigue and some dysphagia. She denies palpitations, hot/cold intolerance. Past Medical History:   Diagnosis Date    Arthritis     back    Atrial fibrillation (HCC)     Fluid retention     Graves disease 10/2019    with large multinodular goiter, s/p negative biopsy of cold nodule in left lobe     Hypercholesterolemia     Hypertension     Long term current use of anticoagulant therapy     Obesity     MELQUIADES on CPAP     Stroke ([facility] [address] 2018    still has some mild affect on her vision and speech     Past Surgical History:   Procedure Laterality Date    HX GYN      tubal ligation    HX HERNIA REPAIR  14    open incisional hernia repair with underlay mesh and extensive  lysis of adhesions for 2 hours - [facility]- DR. Izquierdo    HX HERNIA REPAIR  2017    exp. Lap. with open ventral hernia repair with mesh-[facility]-Dr. Ruby Zacarias. [place]    HX KNEE REPLACEMENT Right     HX OTHER SURGICAL  13    PSBO with ventral hernia repair by DDDR.  Sulaiman Saucedo - [facility]    HX ROTATOR CUFF REPAIR Right      Family History   Problem Relation Age of Onset    Heart Attack Mother 80    Diabetes Mother     Heart Disease Mother     Hypertension Mother     Stroke Father 52    Hypertension Father     Diabetes Sister     Thyroid Disease Sister         hyperthyroidism    Diabetes Brother     Thyroid Disease Son         developed after kidney removal but since normalized     Social History     Tobacco Use    Smoking status: Former Smoker     Packs/day: 0.50     Years: 23.00     Pack years: 11.50     Quit date: 1998     Years since quittin.2    Smokeless tobacco: Never Used   Vaping Use    Vaping Use: Never used   Substance Use Topics    Alcohol use: Not Currently     Comment: a couple times a year    Drug use: Not Currently     Current Outpatient Medications   Medication Sig    methIMAzole (TAPAZOLE) 10 mg tablet Take 2 tabs daily    Xarelto 20 mg tab tablet TAKE 1 TABLET BY MOUTH ONCE DAILY WITH EVENING MEAL    clotrimazole (LOTRIMIN) 1 % topical cream APPLY THE AFFECTED AND SURROUNDING AREAS OF SKIN TOPICALLY TWICE DAILY IN THE MORNING AND EVENING.  atorvastatin (LIPITOR) 40 mg tablet TAKE 1 TABLET BY MOUTH AT BEDTIME    hydrALAZINE (APRESOLINE) 100 mg tablet TAKE 1 TABLET BY MOUTH THREE TIMES DAILY WITH FOOD    isosorbide mononitrate ER (IMDUR) 30 mg tablet TAKE 1 TABLET BY MOUTH ONCE DAILY    aspirin delayed-release 81 mg tablet Take  by mouth.  losartan (COZAAR) 100 mg tablet TAKE 1 TABLET BY MOUTH ONCE DAILY. (D C LOSARTAN HCTZ)    furosemide (LASIX) 40 mg tablet Take 40-80 mg by mouth daily.  potassium chloride (K-DUR, KLOR-CON) 20 mEq tablet daily.  ACETAMINOPHEN (TYLENOL PO) Take  by mouth as needed. No current facility-administered medications for this visit. Allergies   Allergen Reactions    Tobramycin Swelling       Review of Systems   Constitutional: Negative for chills, diaphoresis, fever and weight loss. HENT: Negative for sore throat. Eyes: Negative for blurred vision and discharge. Respiratory: Negative for cough, shortness of breath and wheezing. Cardiovascular: Positive for leg swelling. Negative for chest pain, palpitations, orthopnea and claudication. Gastrointestinal: Negative for abdominal pain, constipation, diarrhea, heartburn, melena, nausea and vomiting. Genitourinary: Negative for dysuria, flank pain, frequency and hematuria. Musculoskeletal: Positive for back pain. Negative for joint pain, myalgias and neck pain. Skin: Negative for rash.    Neurological: Negative for dizziness, speech change, focal weakness, seizures, loss of consciousness, weakness and headaches. Endo/Heme/Allergies: Does not bruise/bleed easily. Psychiatric/Behavioral: Negative for depression and memory loss. Visit Vitals  /82 (BP 1 Location: Left upper arm, BP Patient Position: Sitting, BP Cuff Size: Adult)   Pulse 90   Temp 97.3 °F (36.3 °C) (Temporal)   Resp 18   Ht 5' 4\" (1.626 m)   Wt 131.1 kg (289 lb)   SpO2 100%   BMI 49.61 kg/m²       Physical Exam  Constitutional:       General: She is not in acute distress. Appearance: She is well-developed. She is not diaphoretic. HENT:      Head: Normocephalic and atraumatic. Mouth/Throat:      Pharynx: No oropharyngeal exudate. Eyes:      General: No scleral icterus. Conjunctiva/sclera: Conjunctivae normal.      Pupils: Pupils are equal, round, and reactive to light. Neck:      Thyroid: Thyroid mass and thyromegaly present. No thyroid tenderness. Vascular: No JVD. Trachea: Trachea and phonation normal. No tracheal deviation. Cardiovascular:      Rate and Rhythm: Normal rate and regular rhythm. Heart sounds: No murmur heard. No friction rub. No gallop. Pulmonary:      Effort: Pulmonary effort is normal. No respiratory distress. Breath sounds: Normal breath sounds. No wheezing or rales. Abdominal:      General: Bowel sounds are normal. There is no distension. Palpations: Abdomen is soft. There is no mass. Tenderness: There is no abdominal tenderness. There is no guarding or rebound. Musculoskeletal:         General: Normal range of motion. Cervical back: Normal range of motion and neck supple. Lymphadenopathy:      Cervical: No cervical adenopathy. Skin:     General: Skin is warm and dry. Coloration: Skin is not pale. Findings: No erythema or rash. Neurological:      Mental Status: She is alert and oriented to person, place, and time. Cranial Nerves: No cranial nerve deficit.       Gait: Gait abnormal (uses cane). Psychiatric:         Behavior: Behavior normal.         Thought Content: Thought content normal.         Judgment: Judgment normal.         ASSESSMENT and PLAN  1. Multinodular goiter and Graves disease refractory to medical therapy, although she has symptom control on MMI. I explained about the anatomy and pathophysiology of thyroid nodules/disease. I explained about possible malignancy. I discussed FNA, observation, possible thyroid suppression pending FNA, and total thyroidectomy. Risks of surgery include bleeding (potentially requiring emergent exploration at bedside), infection, parathyroid injury/removal requiring supplemental calcium +/- vit d, recurrent laryngeal/superior laryngeal nerve injury resulting in vocal cord paralysis, voice changes and fatigue, aspiration, further surgery, need for lifelong thyroid supplementation. 2.  Morbid obesity. Body mass index is 49.61 kg/m². Recommended diet modification and increased activity    3. Essential hypertension. Controlled on rx  4. CAD. Stable. Cleared for surgery by her cardiologist, Marcello Gresham MD, in [place], [place]    She desires a total thyroidectomy under general anesthesia with a NIM tube as an outpatient with an overnight stay    The patient was counseled at length about the risks of lowell Covid-19 in the anuj-operative and post-operative states including the recovery window of their procedure. The patient was made aware that lowell Covid-19 after a surgical procedure may worsen their prognosis for recovering from the virus and lend to a higher morbidity and or mortality risk. The patient was given the options of postponing their procedure. All of the risks, benefits, and alternatives were discussed. The patient  wishes to proceed with the procedure.     Mckenna Ware MD FACS

## 2022-03-21 NOTE — LETTER
3/21/2022    Patient: Tavares Barrera   YOB: 1950   Date of Visit: 3/21/2022     Tameka England MD  [address] [place] [address] [address]  [place] [address].  [place]    Dear Tameka England MD,      Thank you for referring Ms. Silke Power to [address] for evaluation. My notes for this consultation are attached. If you have questions, please do not hesitate to call me. I look forward to following your patient along with you.       Sincerely,    Laurence Cheek MD

## 2022-03-21 NOTE — PROGRESS NOTES
Identified pt with two pt identifiers(name and ). Reviewed record in preparation for visit and have obtained necessary documentation. All patient medications has been reviewed. Chief Complaint   Patient presents with    Thyroid Problem     seen at the request of Dr Shameka Ramey for evaluation of her thyroid       Health Maintenance Due   Topic    Hepatitis C Screening     Depression Screen     COVID-19 Vaccine (1)    Lipid Screen     DTaP/Tdap/Td series (1 - Tdap)    Colorectal Cancer Screening Combo     Shingrix Vaccine Age 50> (1 of 2)    Breast Cancer Screen Mammogram     Bone Densitometry (Dexa) Screening     Pneumococcal 65+ years (1 of 1 - PPSV23)    Flu Vaccine (1)       Vitals:    22 1016   BP: 120/82   Pulse: 90   Resp: 18   Temp: 97.3 °F (36.3 °C)   TempSrc: Temporal   SpO2: 100%   Weight: 131.1 kg (289 lb)   Height: 5' 4\" (1.626 m)   PainSc:   0 - No pain       4. Have you been to the ER, urgent care clinic since your last visit? Hospitalized since your last visit? No    5. Have you seen or consulted any other health care providers outside of the [address] [place] [address] since your last visit? Include any pap smears or colon screening. No      Patient is accompanied by daughter I have received verbal consent from Astrid العلي to discuss any/all medical information while they are present in the room.

## 2022-03-24 PROBLEM — E04.2 MULTINODULAR GOITER: Status: ACTIVE | Noted: 2022-03-21

## 2022-03-24 PROBLEM — E05.00 GRAVES DISEASE: Status: ACTIVE | Noted: 2022-03-21

## 2022-04-06 NOTE — PERIOP NOTES
Patient lives in [place], called to PCP Dr. Fredi Ramirez [phone], requested most recent labs to be faxed.

## 2022-04-15 NOTE — PERIOP NOTES
[facility]  Preoperative Instructions        Surgery Date 4/28/22          Time of Arrival:  To Be Called  Contact # [phone]    1. On the day of your surgery, please report to the Surgical Services Registration Desk and sign in at your designated time. The Surgery Center is located to the right of the Emergency Room. 2. You must have someone with you to drive you home. You should not drive a car for 24 hours following surgery. Please make arrangements for a friend or family member to stay with you for the first 24 hours after your surgery. 3. Do not have anything to eat or drink (including water, gum, mints, coffee, juice) after midnight ?4/27/22  . ? This may not apply to medications prescribed by your physician. ?(Please note below the special instructions with medications to take the morning of your procedure.)    4. We recommend you do not drink any alcoholic beverages for 24 hours before and after your surgery. 5. Contact your surgeons office for instructions on the following medications: non-steroidal anti-inflammatory drugs (i.e. Advil, Aleve), vitamins, and supplements. (Some surgeons will want you to stop these medications prior to surgery and others may allow you to take them)  **If you are currently taking Plavix, Coumadin, Aspirin and/or other blood-thinning agents, contact your surgeon for instructions. ** Your surgeon will partner with the physician prescribing these medications to determine if it is safe to stop or if you need to continue taking. Please do not stop taking these medications without instructions from your surgeon    6. Wear comfortable clothes. Wear glasses instead of contacts. Do not bring any money or jewelry. Please bring picture ID, insurance card, and any prearranged co-payment or hospital payment. Do not wear make-up, particularly mascara the morning of your surgery.   Do not wear nail polish, particularly if you are having foot /hand surgery. Wear your hair loose or down, no ponytails, buns, ozzy pins or clips. All body piercings must be removed. Please shower with antibacterial soap for three consecutive days before and on the morning of surgery, but do not apply any lotions, powders or deodorants after the shower on the day of surgery. Please use a fresh towels after each shower. Please sleep in clean clothes and change bed linens the night before surgery. Please do not shave for 48 hours prior to surgery. Shaving of the face is acceptable. 7. You should understand that if you do not follow these instructions your surgery may be cancelled. If your physical condition changes (I.e. fever, cold or flu) please contact your surgeon as soon as possible. 8. It is important that you be on time. If a situation occurs where you may be late, please call [phone] (OR Holding Area). 9. If you have any questions and or problems, please call [phone] (Pre-admission Testing). 10. Your surgery time may be subject to change. You will receive a phone call the evening prior if your time changes. 11.  If having outpatient surgery, you must have someone to drive you here, stay with you during the duration of your stay, and to drive you home at time of discharge. 12.  Due to current COVID restrictions, only ONE adult may accompany you the day of the procedure. We have limited seating available. If our waiting room is at capacity, your ride may be asked to remain in their vehicle. No children are allowed in the waiting room. Special Instructions: Follow instructions regarding Xarelto and Aspirin per cardiologist.    TAKE ALL MEDICATIONS DAY OF SURGERY EXCEPT: No exceptions. I understand a pre-operative phone call will be made to verify my surgery time. In the event that I am not available, I give permission for a message to be left on my answering service and/or with another person?   yes         ___________________ __________   _________    (Signature of Patient)             (Witness)                (Date and Time)

## 2022-04-15 NOTE — PERIOP NOTES
4/14/22  [place] to request cardiac clearance and instructions for Xarelto and Aspirin. Fax number provided. 4/15/22  PAT phone call with pt. Pt had not received instructions from cardiologist at time of clearance for Xarelto and Aspirin prior to surgery. Spoke with Jessy in Dr. Moon Hawthorne office to relay that we did not have clearance with instructions.   Jessy to let us know of any information she receives from cardiologist.

## 2022-04-21 ENCOUNTER — VIRTUAL VISIT (OUTPATIENT)
Dept: ENDOCRINOLOGY | Age: 72
End: 2022-04-21
Payer: COMMERCIAL

## 2022-04-21 ENCOUNTER — TELEPHONE (OUTPATIENT)
Dept: ENDOCRINOLOGY | Age: 72
End: 2022-04-21

## 2022-04-21 DIAGNOSIS — E05.00 GRAVES DISEASE: Primary | ICD-10-CM

## 2022-04-21 PROCEDURE — 99443 PR PHYS/QHP TELEPHONE EVALUATION 21-30 MIN: CPT | Performed by: INTERNAL MEDICINE

## 2022-04-21 RX ORDER — LEVOTHYROXINE SODIUM 150 UG/1
150 TABLET ORAL
Qty: 90 TABLET | Refills: 3 | Status: SHIPPED | OUTPATIENT
Start: 2022-04-21 | End: 2022-07-06 | Stop reason: ALTCHOICE

## 2022-04-21 RX ORDER — METHIMAZOLE 10 MG/1
TABLET ORAL
Qty: 9 TABLET | Refills: 0 | Status: ON HOLD | OUTPATIENT
Start: 2022-04-21 | End: 2022-04-28 | Stop reason: ALTCHOICE

## 2022-04-21 NOTE — PATIENT INSTRUCTIONS
1) Keep taking methimazole 2.5 tabs everyday and your last dose will be on Wednesday 4/27/22. [facility] to last until your surgery. 2) After your surgery on 4/28/22, you will start levothyroxine 150 mcg everyday for the rest of your life. Your first dose will be on Friday 4/29/22 and this will be given in the hospital so your first dose out of the hospital will be on Saturday 4/30/22. 3) Take Levothyroxine either in the morning with just water, at least 30 minutes before breakfast and coffee and all other pills, or take it at bedtime on any empty stomach 2-3 hours after dinner. This must be  from vitamins, calcium, or iron by at least 4 hours. 4) I don't know if you will need any calcium after the surgery but if you do, be sure to take your first dose at lunch. 5) Please come for a follow up visit on 6/13/22 at 12:10p in our [facility] office. 6) Take the enclosed lab slip to repeat your labs in the week prior to your next visit.

## 2022-04-21 NOTE — PROGRESS NOTES
Chief Complaint   Patient presents with    Thyroid Problem       **THIS IS A VIRTUAL VISIT VIA A TELEPHONE ENCOUNTER. PATIENT AGREED TO HAVE THEIR CARE DELIVERED OVER THE PHONE IN PLACE OF THEIR REGULARLY SCHEDULED OFFICE VISIT**    History of Present Illness: Hank Rendon is a 73 y.o. female here for follow up of thyroid. I had a phone call with patient and her daughter, Desire Blount, on speaker phone to discuss her upcoming surgery with Dr. Nicolasa Dillard and create a plan for after the surgery. She is scheduled for 4/28/22. She is still taking methimazole 10 mg 2.5 tabs daily and is feeling well in preparation for surgery. She has been given cardiac clearance for this surgery. Current Outpatient Medications   Medication Sig    methIMAzole (TAPAZOLE) 10 mg tablet Take 2 tabs daily (Patient taking differently: Take 2.5 tabs daily)    Xarelto 20 mg tab tablet TAKE 1 TABLET BY MOUTH ONCE DAILY WITH EVENING MEAL    atorvastatin (LIPITOR) 40 mg tablet TAKE 1 TABLET BY MOUTH AT BEDTIME    hydrALAZINE (APRESOLINE) 100 mg tablet TAKE 1 TABLET BY MOUTH THREE TIMES DAILY WITH FOOD    isosorbide mononitrate ER (IMDUR) 30 mg tablet TAKE 1 TABLET BY MOUTH ONCE DAILY    aspirin delayed-release 81 mg tablet Take 81 mg by mouth daily.  losartan (COZAAR) 100 mg tablet TAKE 1 TABLET BY MOUTH ONCE DAILY. (D C LOSARTAN HCTZ)    furosemide (LASIX) 40 mg tablet Take 40-80 mg by mouth daily.  potassium chloride (K-DUR, KLOR-CON) 20 mEq tablet daily.  ACETAMINOPHEN (TYLENOL PO) Take  by mouth as needed. No current facility-administered medications for this visit. Allergies   Allergen Reactions    Tobramycin Swelling     Review of Systems: PER HPI    Physical Examination: NOT PERFORMED DUE TO THIS BEING A TELEPHONE CALL      Data Reviewed:   - none new for review    Assessment/Plan:     1. Graves disease:  Had labs drawn on 10/14/19 that showed her TSH was low at 0.01 and FT4 was high at 5.27 confirming she has hyperthyroidism. Was sent for a thyroid ultrasound in 10/19 that showed a large multinodular goiter with 4 nodules in the 2.3-5.1 cm range. Also had a thyroid uptake scan that showed 63.4% uptake consistent with Grave's as there were no hot nodules. Was sent to a thyroid surgeon, Dr. Michelle Aragon and he ordered a biopsy and this came back without any evidence of thyroid cancer in her left nodule that was cold on appearance on the thyroid scan. Was started on methimazole 10 mg bid on 10/26/19 and had repeat labs on 11/29/19 that showed her TSH was 0.00 and FT4 down to 1.29. At her first visit with me on 12/5/19, she did not have any compressive symptoms to warrant surgery and since her free T4 had normalized on methimazole, I'm hopeful that we can manage her hyperthyroidism using methimazole and not need surgery or TELLEZ. I also don't think TELLEZ would work given the size of her goiter. She was agreeable to continuing methimazole for now. Repeat labs in 1/20 showed TSH 0.01 and FT4 0.87 and TRAb 19 so decreased her dose to 15 mg daily. In 3/20, TSH was 0.01, FT4 0.96 and TRAb 17 so kept dose the same. In 4/20, TSH was 0.03, FT4 0.90 and TRAb 14 so kept dose the same. TSH 0.027 and TRAb 14.6 in 6/20 so increased to 20 mg daily. TSH 0.83 and TRAb 11 in 8/20 but didn't receive the results from PCP until 10/20 and decreased her dose to 20 mg on Mon-Fri and 10 mg on Sat-Sun and TSH was 2.54 and TRAb 7.95 in 12/20 so decreased to 15 mg daily but TSH down to 0.014 and TRAb up to 12.7 in 6/21 so increased back to 20 mg daily. TSH 2.97 and TRAb < 1.1 in 8/21 so decreased to 20 mg 6x/week and TSH 0.01 and TRAb 12.5 in 11/21 so increased back to 2 tabs daily. TSH 0.008 and FT4 1.45 in 1/21 so increased to 2.5 tabs daily=25 mg daily.   Given she has been on treatment for 2 years and has not been able to go into remission and her goiter is enlarging, I felt it was reasonable to consider total thyroidectomy for definitive treatment provided she is at acceptable risk per her cardiologist, Dr. Janessa Henson, and she has been given approval for surgery and is scheduled to have total thyroidectomy with Dr. Sarath Butler on 4/28/22. I will have her continue methimazole through 4/27/22 and then stop. I have sent a prescription for levothyroxine 150 mcg daily = 1.2 mcg/kg/d, which is weight based thyroid replacement for someone with her BMI to start the day after surgery. I have given her a follow up appointment for 6 weeks after surgery and have explained how to properly take the levothyroxine and if she needs calcium supplementation after her surgery to be sure to keep this at least 4 hours apart from her levothyroxine. I mailed her a lab slip to repeat labs prior to her visit. All questions were answered. Given she lives in [place], will minimize her trips to [facility] and coordinate her lab draws with her PCP. - cont methimazole 25 mg daily through 4/27/22 and then stop  - have total thyroidectomy with Dr. Sarath Butler on 4/28/22  - begin levothyroxine 150 mcg daily on 4/29/22  - check TSH, free T4, bmp and PTH prior to next visit        Patient Instructions   1) Keep taking methimazole 2.5 tabs everyday and your last dose will be on Wednesday 4/27/22. I sent 9 tabs to [facility] to last until your surgery. 2) After your surgery on 4/28/22, you will start levothyroxine 150 mcg everyday for the rest of your life. Your first dose will be on Friday 4/29/22 and this will be given in the hospital so your first dose out of the hospital will be on Saturday 4/30/22. 3) Take Levothyroxine either in the morning with just water, at least 30 minutes before breakfast and coffee and all other pills, or take it at bedtime on any empty stomach 2-3 hours after dinner. This must be  from vitamins, calcium, or iron by at least 4 hours.       4) I don't know if you will need any calcium after the surgery but if you do, be sure to take your first dose at lunch. 5) Please come for a follow up visit on 6/13/22 at 12:10p in our [place] office. 6) Take the enclosed lab slip to repeat your labs in the week prior to your next visit. Follow-up and Dispositions    · Return 6/13/22 at 12:10p. I spent a total of 30 minutes on the day of this virtual visit with a telephone encounter. All questions were answered and a copy of the instructions were sent to her via [employer] and a letter.       Copy sent to:  Dr. Lorena Meyer phone: [phone], fax: [phone]  Dr. Jacobs Anchors

## 2022-04-21 NOTE — LETTER
4/21/2022 3:24 PM    Ms. Brea Leon [facility] [phone]    1) Keep taking methimazole 2.5 tabs everyday and your last dose will be on Wednesday 4/27/22. I sent 9 tabs to [facility] to last until your surgery. 2) After your surgery on 4/28/22, you will start levothyroxine 150 mcg everyday for the rest of your life. Your first dose will be on Friday 4/29/22 and this will be given in the hospital so your first dose out of the hospital will be on Saturday 4/30/22. 3) Take Levothyroxine either in the morning with just water, at least 30 minutes before breakfast and coffee and all other pills, or take it at bedtime on any empty stomach 2-3 hours after dinner. This must be  from vitamins, calcium, or iron by at least 4 hours. If you ever do miss a dose of levothyroxine, it's okay to take 2 pills the next day to catch up on your dose. The goal is always to get 7 pills per week and even if you have to double up several days in a row to make up for missed doses, this is better than letting your weekly level fall. 4) I don't know if you will need any calcium after the surgery but if you do, be sure to take your first dose at lunch. 5) Please come for a follow up visit on 6/13/22 at 12:10p in our [place] office. 6) Take the enclosed lab slip to repeat your labs in the week prior to your next visit.           Sincerely,      Nicanor Fatima MD

## 2022-04-21 NOTE — TELEPHONE ENCOUNTER
4/21/2022  4:24 PM    Pharmacist called from [address] [facility] did not want to leave name. Pharmacist stated the patient is having a drug interaction to her levothyroxine. [facility]acy is [facility] [address] [facility]      ThanksRoberto

## 2022-04-21 NOTE — TELEPHONE ENCOUNTER
Per Ashok Nam, patient's daughter, the pharmacy is seeing a conflict because of two drugs that were prescribed but they are aware of how to take it.

## 2022-04-27 ENCOUNTER — ANESTHESIA EVENT (OUTPATIENT)
Dept: SURGERY | Age: 72
End: 2022-04-27
Payer: COMMERCIAL

## 2022-04-28 ENCOUNTER — ANESTHESIA (OUTPATIENT)
Dept: SURGERY | Age: 72
End: 2022-04-28
Payer: COMMERCIAL

## 2022-04-28 ENCOUNTER — HOSPITAL ENCOUNTER (OUTPATIENT)
Age: 72
Discharge: HOME | End: 2022-04-29
Attending: SURGERY | Admitting: SURGERY
Payer: COMMERCIAL

## 2022-04-28 DIAGNOSIS — E21.3 HYPERPARATHYROIDISM (HCC): ICD-10-CM

## 2022-04-28 DIAGNOSIS — E04.2 MULTINODULAR GOITER: Primary | ICD-10-CM

## 2022-04-28 DIAGNOSIS — E05.00 GRAVES DISEASE: ICD-10-CM

## 2022-04-28 LAB — CALCIUM SERPL-MCNC: 8.9 MG/DL (ref 8.5–10.1)

## 2022-04-28 PROCEDURE — 76210000000 HC OR PH I REC 2 TO 2.5 HR: Performed by: SURGERY

## 2022-04-28 PROCEDURE — 77030002996 HC SUT SLK J&J -A: Performed by: SURGERY

## 2022-04-28 PROCEDURE — 74011250636 HC RX REV CODE- 250/636: Performed by: NURSE ANESTHETIST, CERTIFIED REGISTERED

## 2022-04-28 PROCEDURE — 77030010375

## 2022-04-28 PROCEDURE — 2709999900 HC NON-CHARGEABLE SUPPLY: Performed by: SURGERY

## 2022-04-28 PROCEDURE — 77030019655 HC PRB STIM CRAN MEDT -B: Performed by: SURGERY

## 2022-04-28 PROCEDURE — 74011000250 HC RX REV CODE- 250: Performed by: SURGERY

## 2022-04-28 PROCEDURE — 74011000250 HC RX REV CODE- 250: Performed by: NURSE ANESTHETIST, CERTIFIED REGISTERED

## 2022-04-28 PROCEDURE — 77030010938 HC CLP LIG TELE -A: Performed by: SURGERY

## 2022-04-28 PROCEDURE — 82310 ASSAY OF CALCIUM: CPT

## 2022-04-28 PROCEDURE — 77030008698 HC TU ET REINF MEDT -D: Performed by: ANESTHESIOLOGY

## 2022-04-28 PROCEDURE — 36415 COLL VENOUS BLD VENIPUNCTURE: CPT

## 2022-04-28 PROCEDURE — 74011250636 HC RX REV CODE- 250/636: Performed by: SURGERY

## 2022-04-28 PROCEDURE — 76060000036 HC ANESTHESIA 2.5 TO 3 HR: Performed by: SURGERY

## 2022-04-28 PROCEDURE — 77030018390 HC SPNG HEMSTAT2 J&J -B: Performed by: SURGERY

## 2022-04-28 PROCEDURE — 77030010507 HC ADH SKN DERMBND J&J -B: Performed by: SURGERY

## 2022-04-28 PROCEDURE — 74011250636 HC RX REV CODE- 250/636: Performed by: ANESTHESIOLOGY

## 2022-04-28 PROCEDURE — 88305 TISSUE EXAM BY PATHOLOGIST: CPT

## 2022-04-28 PROCEDURE — 77030031139 HC SUT VCRL2 J&J -A: Performed by: SURGERY

## 2022-04-28 PROCEDURE — 76010000132 HC OR TIME 2.5 TO 3 HR: Performed by: SURGERY

## 2022-04-28 PROCEDURE — 77030034626 HC LIGASURE SM JAW SEAL OPN SURG COVD -E: Performed by: SURGERY

## 2022-04-28 PROCEDURE — 88307 TISSUE EXAM BY PATHOLOGIST: CPT

## 2022-04-28 PROCEDURE — 60240 REMOVAL OF THYROID: CPT | Performed by: SURGERY

## 2022-04-28 PROCEDURE — 77030026438 HC STYL ET INTUB CARD -A: Performed by: ANESTHESIOLOGY

## 2022-04-28 PROCEDURE — 74011250637 HC RX REV CODE- 250/637: Performed by: SURGERY

## 2022-04-28 PROCEDURE — 77030011267 HC ELECTRD BLD COVD -A: Performed by: SURGERY

## 2022-04-28 RX ORDER — ONDANSETRON 4 MG/1
4 TABLET, ORALLY DISINTEGRATING ORAL
Status: DISCONTINUED | OUTPATIENT
Start: 2022-04-28 | End: 2022-04-29 | Stop reason: HOSPADM

## 2022-04-28 RX ORDER — KETAMINE HYDROCHLORIDE 10 MG/ML
INJECTION, SOLUTION INTRAMUSCULAR; INTRAVENOUS AS NEEDED
Status: DISCONTINUED | OUTPATIENT
Start: 2022-04-28 | End: 2022-04-28 | Stop reason: HOSPADM

## 2022-04-28 RX ORDER — MORPHINE SULFATE 2 MG/ML
1-2 INJECTION, SOLUTION INTRAMUSCULAR; INTRAVENOUS
Status: DISCONTINUED | OUTPATIENT
Start: 2022-04-28 | End: 2022-04-29 | Stop reason: HOSPADM

## 2022-04-28 RX ORDER — SODIUM CHLORIDE 0.9 % (FLUSH) 0.9 %
5-40 SYRINGE (ML) INJECTION EVERY 8 HOURS
Status: DISCONTINUED | OUTPATIENT
Start: 2022-04-28 | End: 2022-04-28 | Stop reason: HOSPADM

## 2022-04-28 RX ORDER — SODIUM CHLORIDE 0.9 % (FLUSH) 0.9 %
5-40 SYRINGE (ML) INJECTION EVERY 8 HOURS
Status: DISCONTINUED | OUTPATIENT
Start: 2022-04-28 | End: 2022-04-29 | Stop reason: HOSPADM

## 2022-04-28 RX ORDER — ISOSORBIDE MONONITRATE 30 MG/1
30 TABLET, EXTENDED RELEASE ORAL DAILY
Status: DISCONTINUED | OUTPATIENT
Start: 2022-04-29 | End: 2022-04-29 | Stop reason: HOSPADM

## 2022-04-28 RX ORDER — SODIUM CHLORIDE, SODIUM LACTATE, POTASSIUM CHLORIDE, CALCIUM CHLORIDE 600; 310; 30; 20 MG/100ML; MG/100ML; MG/100ML; MG/100ML
25 INJECTION, SOLUTION INTRAVENOUS CONTINUOUS
Status: DISCONTINUED | OUTPATIENT
Start: 2022-04-28 | End: 2022-04-28 | Stop reason: HOSPADM

## 2022-04-28 RX ORDER — OXYCODONE AND ACETAMINOPHEN 5; 325 MG/1; MG/1
1 TABLET ORAL
Status: DISCONTINUED | OUTPATIENT
Start: 2022-04-28 | End: 2022-04-29 | Stop reason: HOSPADM

## 2022-04-28 RX ORDER — ONDANSETRON 2 MG/ML
INJECTION INTRAMUSCULAR; INTRAVENOUS AS NEEDED
Status: DISCONTINUED | OUTPATIENT
Start: 2022-04-28 | End: 2022-04-28 | Stop reason: HOSPADM

## 2022-04-28 RX ORDER — FUROSEMIDE 40 MG/1
40 TABLET ORAL DAILY
Status: DISCONTINUED | OUTPATIENT
Start: 2022-04-29 | End: 2022-04-29 | Stop reason: HOSPADM

## 2022-04-28 RX ORDER — ENOXAPARIN SODIUM 150 MG/ML
120 INJECTION SUBCUTANEOUS
COMMUNITY
End: 2022-06-13

## 2022-04-28 RX ORDER — PROPOFOL 10 MG/ML
INJECTION, EMULSION INTRAVENOUS AS NEEDED
Status: DISCONTINUED | OUTPATIENT
Start: 2022-04-28 | End: 2022-04-28 | Stop reason: HOSPADM

## 2022-04-28 RX ORDER — FENTANYL CITRATE 50 UG/ML
25 INJECTION, SOLUTION INTRAMUSCULAR; INTRAVENOUS
Status: DISCONTINUED | OUTPATIENT
Start: 2022-04-28 | End: 2022-04-28 | Stop reason: HOSPADM

## 2022-04-28 RX ORDER — HYDROMORPHONE HYDROCHLORIDE 2 MG/ML
INJECTION, SOLUTION INTRAMUSCULAR; INTRAVENOUS; SUBCUTANEOUS AS NEEDED
Status: DISCONTINUED | OUTPATIENT
Start: 2022-04-28 | End: 2022-04-28 | Stop reason: HOSPADM

## 2022-04-28 RX ORDER — SUCCINYLCHOLINE CHLORIDE 20 MG/ML
INJECTION INTRAMUSCULAR; INTRAVENOUS AS NEEDED
Status: DISCONTINUED | OUTPATIENT
Start: 2022-04-28 | End: 2022-04-28 | Stop reason: HOSPADM

## 2022-04-28 RX ORDER — BUPIVACAINE HYDROCHLORIDE AND EPINEPHRINE 5; 5 MG/ML; UG/ML
INJECTION, SOLUTION PERINEURAL AS NEEDED
Status: DISCONTINUED | OUTPATIENT
Start: 2022-04-28 | End: 2022-04-28 | Stop reason: HOSPADM

## 2022-04-28 RX ORDER — MIDAZOLAM HYDROCHLORIDE 1 MG/ML
INJECTION, SOLUTION INTRAMUSCULAR; INTRAVENOUS AS NEEDED
Status: DISCONTINUED | OUTPATIENT
Start: 2022-04-28 | End: 2022-04-28 | Stop reason: HOSPADM

## 2022-04-28 RX ORDER — LIDOCAINE HYDROCHLORIDE 20 MG/ML
INJECTION, SOLUTION EPIDURAL; INFILTRATION; INTRACAUDAL; PERINEURAL AS NEEDED
Status: DISCONTINUED | OUTPATIENT
Start: 2022-04-28 | End: 2022-04-28 | Stop reason: HOSPADM

## 2022-04-28 RX ORDER — ONDANSETRON 2 MG/ML
4 INJECTION INTRAMUSCULAR; INTRAVENOUS AS NEEDED
Status: DISCONTINUED | OUTPATIENT
Start: 2022-04-28 | End: 2022-04-28 | Stop reason: HOSPADM

## 2022-04-28 RX ORDER — LOSARTAN POTASSIUM 100 MG/1
100 TABLET ORAL DAILY
Status: DISCONTINUED | OUTPATIENT
Start: 2022-04-29 | End: 2022-04-29 | Stop reason: HOSPADM

## 2022-04-28 RX ORDER — SODIUM CHLORIDE 0.9 % (FLUSH) 0.9 %
5-40 SYRINGE (ML) INJECTION AS NEEDED
Status: DISCONTINUED | OUTPATIENT
Start: 2022-04-28 | End: 2022-04-28 | Stop reason: HOSPADM

## 2022-04-28 RX ORDER — HYDROMORPHONE HYDROCHLORIDE 1 MG/ML
0.2 INJECTION, SOLUTION INTRAMUSCULAR; INTRAVENOUS; SUBCUTANEOUS
Status: DISCONTINUED | OUTPATIENT
Start: 2022-04-28 | End: 2022-04-28 | Stop reason: HOSPADM

## 2022-04-28 RX ORDER — DEXAMETHASONE SODIUM PHOSPHATE 4 MG/ML
INJECTION, SOLUTION INTRA-ARTICULAR; INTRALESIONAL; INTRAMUSCULAR; INTRAVENOUS; SOFT TISSUE AS NEEDED
Status: DISCONTINUED | OUTPATIENT
Start: 2022-04-28 | End: 2022-04-28 | Stop reason: HOSPADM

## 2022-04-28 RX ORDER — OXYCODONE AND ACETAMINOPHEN 5; 325 MG/1; MG/1
1 TABLET ORAL
Qty: 10 TABLET | Refills: 0 | Status: SHIPPED | OUTPATIENT
Start: 2022-04-28 | End: 2022-04-29

## 2022-04-28 RX ORDER — GLYCOPYRROLATE 0.2 MG/ML
INJECTION INTRAMUSCULAR; INTRAVENOUS AS NEEDED
Status: DISCONTINUED | OUTPATIENT
Start: 2022-04-28 | End: 2022-04-28 | Stop reason: HOSPADM

## 2022-04-28 RX ORDER — SODIUM CHLORIDE 0.9 % (FLUSH) 0.9 %
5-40 SYRINGE (ML) INJECTION AS NEEDED
Status: DISCONTINUED | OUTPATIENT
Start: 2022-04-28 | End: 2022-04-29 | Stop reason: HOSPADM

## 2022-04-28 RX ORDER — DEXTROSE, SODIUM CHLORIDE, AND POTASSIUM CHLORIDE 5; .45; .15 G/100ML; G/100ML; G/100ML
25 INJECTION INTRAVENOUS CONTINUOUS
Status: DISCONTINUED | OUTPATIENT
Start: 2022-04-28 | End: 2022-04-29

## 2022-04-28 RX ORDER — ROCURONIUM BROMIDE 10 MG/ML
INJECTION, SOLUTION INTRAVENOUS AS NEEDED
Status: DISCONTINUED | OUTPATIENT
Start: 2022-04-28 | End: 2022-04-28 | Stop reason: HOSPADM

## 2022-04-28 RX ORDER — PHENYLEPHRINE HCL IN 0.9% NACL 0.4MG/10ML
SYRINGE (ML) INTRAVENOUS AS NEEDED
Status: DISCONTINUED | OUTPATIENT
Start: 2022-04-28 | End: 2022-04-28 | Stop reason: HOSPADM

## 2022-04-28 RX ORDER — NALOXONE HYDROCHLORIDE 0.4 MG/ML
0.2 INJECTION, SOLUTION INTRAMUSCULAR; INTRAVENOUS; SUBCUTANEOUS
Status: DISCONTINUED | OUTPATIENT
Start: 2022-04-28 | End: 2022-04-29 | Stop reason: HOSPADM

## 2022-04-28 RX ORDER — HYDRALAZINE HYDROCHLORIDE 50 MG/1
100 TABLET, FILM COATED ORAL EVERY 8 HOURS
Status: DISCONTINUED | OUTPATIENT
Start: 2022-04-28 | End: 2022-04-29 | Stop reason: HOSPADM

## 2022-04-28 RX ORDER — ATORVASTATIN CALCIUM 40 MG/1
40 TABLET, FILM COATED ORAL
Status: DISCONTINUED | OUTPATIENT
Start: 2022-04-28 | End: 2022-04-29 | Stop reason: HOSPADM

## 2022-04-28 RX ORDER — POTASSIUM CHLORIDE 20 MEQ/1
20 TABLET, EXTENDED RELEASE ORAL DAILY
Status: DISCONTINUED | OUTPATIENT
Start: 2022-04-29 | End: 2022-04-29 | Stop reason: HOSPADM

## 2022-04-28 RX ADMIN — MIDAZOLAM HYDROCHLORIDE 2 MG: 1 INJECTION, SOLUTION INTRAMUSCULAR; INTRAVENOUS at 10:58

## 2022-04-28 RX ADMIN — Medication 80 MCG: at 11:18

## 2022-04-28 RX ADMIN — PROPOFOL 20 MG: 10 INJECTION, EMULSION INTRAVENOUS at 11:35

## 2022-04-28 RX ADMIN — KETAMINE HYDROCHLORIDE 50 MG: 10 INJECTION, SOLUTION INTRAMUSCULAR; INTRAVENOUS at 11:07

## 2022-04-28 RX ADMIN — DEXAMETHASONE SODIUM PHOSPHATE 8 MG: 4 INJECTION, SOLUTION INTRAMUSCULAR; INTRAVENOUS at 11:17

## 2022-04-28 RX ADMIN — PROPOFOL 50 MG: 10 INJECTION, EMULSION INTRAVENOUS at 11:32

## 2022-04-28 RX ADMIN — Medication 3 AMPULE: at 09:59

## 2022-04-28 RX ADMIN — Medication 80 MCG: at 12:51

## 2022-04-28 RX ADMIN — PROPOFOL 200 MG: 10 INJECTION, EMULSION INTRAVENOUS at 11:07

## 2022-04-28 RX ADMIN — PROPOFOL 50 MG: 10 INJECTION, EMULSION INTRAVENOUS at 12:10

## 2022-04-28 RX ADMIN — SODIUM CHLORIDE, PRESERVATIVE FREE 10 ML: 5 INJECTION INTRAVENOUS at 23:18

## 2022-04-28 RX ADMIN — PROPOFOL 75 MCG/KG/MIN: 10 INJECTION, EMULSION INTRAVENOUS at 11:33

## 2022-04-28 RX ADMIN — HYDROMORPHONE HYDROCHLORIDE 0.5 MG: 2 INJECTION, SOLUTION INTRAMUSCULAR; INTRAVENOUS; SUBCUTANEOUS at 12:01

## 2022-04-28 RX ADMIN — HYDROMORPHONE HYDROCHLORIDE 0.5 MG: 2 INJECTION, SOLUTION INTRAMUSCULAR; INTRAVENOUS; SUBCUTANEOUS at 10:58

## 2022-04-28 RX ADMIN — ROCURONIUM BROMIDE 5 MG: 10 INJECTION INTRAVENOUS at 11:07

## 2022-04-28 RX ADMIN — SUCCINYLCHOLINE CHLORIDE 120 MG: 20 INJECTION, SOLUTION INTRAMUSCULAR; INTRAVENOUS at 11:07

## 2022-04-28 RX ADMIN — HYDROMORPHONE HYDROCHLORIDE 0.5 MG: 2 INJECTION, SOLUTION INTRAMUSCULAR; INTRAVENOUS; SUBCUTANEOUS at 11:12

## 2022-04-28 RX ADMIN — HYDROMORPHONE HYDROCHLORIDE 0.5 MG: 2 INJECTION, SOLUTION INTRAMUSCULAR; INTRAVENOUS; SUBCUTANEOUS at 11:53

## 2022-04-28 RX ADMIN — PROPOFOL 50 MG: 10 INJECTION, EMULSION INTRAVENOUS at 11:41

## 2022-04-28 RX ADMIN — GLYCOPYRROLATE 0.1 MG: 0.2 INJECTION, SOLUTION INTRAMUSCULAR; INTRAVENOUS at 10:58

## 2022-04-28 RX ADMIN — HYDRALAZINE HYDROCHLORIDE 100 MG: 50 TABLET, FILM COATED ORAL at 19:00

## 2022-04-28 RX ADMIN — Medication 1 AMPULE: at 17:42

## 2022-04-28 RX ADMIN — POTASSIUM CHLORIDE, DEXTROSE MONOHYDRATE AND SODIUM CHLORIDE 25 ML/HR: 150; 5; 450 INJECTION, SOLUTION INTRAVENOUS at 14:57

## 2022-04-28 RX ADMIN — Medication 80 MCG: at 12:36

## 2022-04-28 RX ADMIN — SODIUM CHLORIDE, POTASSIUM CHLORIDE, SODIUM LACTATE AND CALCIUM CHLORIDE 25 ML/HR: 600; 310; 30; 20 INJECTION, SOLUTION INTRAVENOUS at 09:58

## 2022-04-28 RX ADMIN — ONDANSETRON HYDROCHLORIDE 4 MG: 2 INJECTION, SOLUTION INTRAMUSCULAR; INTRAVENOUS at 10:58

## 2022-04-28 RX ADMIN — LIDOCAINE HYDROCHLORIDE 40 MG: 20 INJECTION, SOLUTION EPIDURAL; INFILTRATION; INTRACAUDAL; PERINEURAL at 11:07

## 2022-04-28 RX ADMIN — Medication 3 G: at 11:15

## 2022-04-28 RX ADMIN — ATORVASTATIN CALCIUM 40 MG: 40 TABLET, FILM COATED ORAL at 23:18

## 2022-04-28 RX ADMIN — MORPHINE SULFATE 2 MG: 2 INJECTION, SOLUTION INTRAMUSCULAR; INTRAVENOUS at 23:21

## 2022-04-28 RX ADMIN — MORPHINE SULFATE 2 MG: 2 INJECTION, SOLUTION INTRAMUSCULAR; INTRAVENOUS at 17:45

## 2022-04-28 RX ADMIN — PROPOFOL 50 MG: 10 INJECTION, EMULSION INTRAVENOUS at 11:52

## 2022-04-28 RX ADMIN — Medication 120 MCG: at 11:21

## 2022-04-28 RX ADMIN — HYDRALAZINE HYDROCHLORIDE 100 MG: 50 TABLET, FILM COATED ORAL at 23:37

## 2022-04-28 RX ADMIN — Medication 1 AMPULE: at 23:18

## 2022-04-28 NOTE — DISCHARGE INSTRUCTIONS
Discharge Instructions:  Thyroidectomy    Dr. Clarinda Dakins    Call for appointment for follow up in 1 week [phone]    Activity:    Walk regularly. You may resume driving in 24 hours unless still requiring narcotics for pain. Work:    You may return to work in 11 - 7 days to light activity. No lifting more than 10 pounds for one week. Diet:    You may resume normal diet after 24 hours. Anesthesia and narcotics may cause nausea and vomiting. If persistent please call the office. Call if you have numbness or tingling around lips or fingertips as this is a sign of low calcium. Wound Care: You have a special dressing called Dermabond. It is okay to shower and let the water run over the incision but do not scrub the area or soak in a tub. If you have a small amount of drainage you may place a dry bandage over the wound and change it daily. If you experience a lot of drainage, develop redness around the wound, or a fever over 101 F occurs please call the office. Medications:    Resume home medications as indicated on the Medical Reconciliation form. Aspirin may be restarted immediately, and Xarelto can be restarted on 5/1     Pain medications:  Non steroidal antiinflammatories seem to work best for post surgical pain. Try these first as prescribed. A narcotic prescription will also be given for breakthrough pain. Over the counter stool softeners and laxatives may be used if needed. Do not hesitate to call with questions or concerns.

## 2022-04-28 NOTE — PERIOP NOTES
[address] from Operating Room to PACU    Report received from [address] and SAMARA Perez CRNA regarding Mami Nguyen. Surgeon(s):  Nila Mac MD  And Procedure(s) (LRB):  TOTAL THYROIDECTOMY WITH NIM (N/A)  confirmed   with allergies and dressings discussed. Anesthesia type, drugs, patient history, complications, estimated blood loss, vital signs, intake and output, and last pain medication, lines, reversal medications and temperature were reviewed. 1500 Patient's daughter, Abdelrahman Youssef, updated via phone. Made her aware that we are still waiting for bed assignment. 1605 TRANSFER - OUT REPORT:    Verbal report given to Ashley NAVA(name) on Mami Nguyen  being transferred to Surgical (unit) for routine post - op       Report consisted of patients Situation, Background, Assessment and   Recommendations(SBAR). Information from the following report(s) SBAR, Kardex, OR Summary, Procedure Summary, Intake/Output and MAR was reviewed with the receiving nurse. Opportunity for questions and clarification was provided. Patient NPO til 1800. Patient transported with:   O2 @ 2 liters  Tech   Patient chart  Patient belongings    56 Updated patient's daughter, Abdelrahman Youssef, via phone and made her aware of patient's room number [phone].

## 2022-04-28 NOTE — ANESTHESIA POSTPROCEDURE EVALUATION
Procedure(s):  TOTAL THYROIDECTOMY WITH NIM. general    Anesthesia Post Evaluation      Multimodal analgesia: multimodal analgesia used between 6 hours prior to anesthesia start to PACU discharge  Patient location during evaluation: PACU  Patient participation: complete - patient participated  Level of consciousness: awake and alert  Pain management: adequate  Airway patency: patent  Anesthetic complications: no  Cardiovascular status: acceptable, hemodynamically stable and blood pressure returned to baseline  Respiratory status: acceptable and room air  Hydration status: euvolemic  Post anesthesia nausea and vomiting:  none  Final Post Anesthesia Temperature Assessment:  Normothermia (36.0-37.5 degrees C)      INITIAL Post-op Vital signs:   Vitals Value Taken Time   /73 04/28/22 1500   Temp 36.6 °C (97.9 °F) 04/28/22 1430   Pulse 51 04/28/22 1502   Resp 11 04/28/22 1502   SpO2 100 % 04/28/22 1502   Vitals shown include unvalidated device data.

## 2022-04-28 NOTE — H&P
HISTORY OF PRESENT ILLNESS  Tricia Gurrola is a 73 y.o. female who comes in for consultation by Dr Wesley Oates for Graves disease  HPI  She has been dealing with Graves disease and a goiter for several years. She has been on methimazole since 10/2019 but has not gone into remission and her thyroid is getting larger. She has difficulty with weight control and fatigue and some dysphagia. She denies palpitations, hot/cold intolerance.          Past Medical History:   Diagnosis Date    Arthritis       back    Atrial fibrillation (HCC)      Fluid retention      Graves disease 10/2019     with large multinodular goiter, s/p negative biopsy of cold nodule in left lobe 11/19    Hypercholesterolemia      Hypertension      Long term current use of anticoagulant therapy      Obesity      MELUQIADES on CPAP      Stroke ([facility] [address] 07/2018     still has some mild affect on her vision and speech            Past Surgical History:   Procedure Laterality Date    HX GYN         tubal ligation    HX HERNIA REPAIR   5-19-14     open incisional hernia repair with underlay mesh and extensive  lysis of adhesions for 2 hours - [facility]- DR. Izquierdo    HX HERNIA REPAIR   01/16/2017     exp. Lap. with open ventral hernia repair with mesh-[facility]-Dr. Shaheen Renae. [place]    HX KNEE REPLACEMENT Right      HX OTHER SURGICAL   12-12-13     PSBO with ventral hernia repair by DDDR.  Paula Pinon - [facility]    HX ROTATOR CUFF REPAIR Right              Family History   Problem Relation Age of Onset    Heart Attack Mother 80    Diabetes Mother      Heart Disease Mother      Hypertension Mother      Stroke Father 52    Hypertension Father      Diabetes Sister      Thyroid Disease Sister           hyperthyroidism    Diabetes Brother      Thyroid Disease Son           developed after kidney removal but since normalized      Social History            Tobacco Use    Smoking status: Former Smoker       Packs/day: 0.50       Years: 23.00       Pack years: 11.50       Quit date: 1998       Years since quittin.2    Smokeless tobacco: Never Used   Vaping Use    Vaping Use: Never used   Substance Use Topics    Alcohol use: Not Currently       Comment: a couple times a year    Drug use: Not Currently           Current Outpatient Medications   Medication Sig    methIMAzole (TAPAZOLE) 10 mg tablet Take 2 tabs daily    Xarelto 20 mg tab tablet TAKE 1 TABLET BY MOUTH ONCE DAILY WITH EVENING MEAL    clotrimazole (LOTRIMIN) 1 % topical cream APPLY THE AFFECTED AND SURROUNDING AREAS OF SKIN TOPICALLY TWICE DAILY IN THE MORNING AND EVENING.  atorvastatin (LIPITOR) 40 mg tablet TAKE 1 TABLET BY MOUTH AT BEDTIME    hydrALAZINE (APRESOLINE) 100 mg tablet TAKE 1 TABLET BY MOUTH THREE TIMES DAILY WITH FOOD    isosorbide mononitrate ER (IMDUR) 30 mg tablet TAKE 1 TABLET BY MOUTH ONCE DAILY    aspirin delayed-release 81 mg tablet Take  by mouth.  losartan (COZAAR) 100 mg tablet TAKE 1 TABLET BY MOUTH ONCE DAILY. (D C LOSARTAN HCTZ)    furosemide (LASIX) 40 mg tablet Take 40-80 mg by mouth daily.  potassium chloride (K-DUR, KLOR-CON) 20 mEq tablet daily.  ACETAMINOPHEN (TYLENOL PO) Take  by mouth as needed.      No current facility-administered medications for this visit.           Allergies   Allergen Reactions    Tobramycin Swelling         Review of Systems   Constitutional: Negative for chills, diaphoresis, fever and weight loss. HENT: Negative for sore throat. Eyes: Negative for blurred vision and discharge. Respiratory: Negative for cough, shortness of breath and wheezing. Cardiovascular: Positive for leg swelling. Negative for chest pain, palpitations, orthopnea and claudication. Gastrointestinal: Negative for abdominal pain, constipation, diarrhea, heartburn, melena, nausea and vomiting. Genitourinary: Negative for dysuria, flank pain, frequency and hematuria. Musculoskeletal: Positive for back pain. Negative for joint pain, myalgias and neck pain. Skin: Negative for rash. Neurological: Negative for dizziness, speech change, focal weakness, seizures, loss of consciousness, weakness and headaches. Endo/Heme/Allergies: Does not bruise/bleed easily. Psychiatric/Behavioral: Negative for depression and memory loss.      Visit Vitals  /82 (BP 1 Location: Left upper arm, BP Patient Position: Sitting, BP Cuff Size: Adult)   Pulse 90   Temp 97.3 °F (36.3 °C) (Temporal)   Resp 18   Ht 5' 4\" (1.626 m)   Wt 131.1 kg (289 lb)   SpO2 100%   BMI 49.61 kg/m²         Physical Exam  Constitutional:       General: She is not in acute distress. Appearance: She is well-developed. She is not diaphoretic. HENT:      Head: Normocephalic and atraumatic. Mouth/Throat:      Pharynx: No oropharyngeal exudate. Eyes:      General: No scleral icterus. Conjunctiva/sclera: Conjunctivae normal.      Pupils: Pupils are equal, round, and reactive to light. Neck:      Thyroid: Thyroid mass and thyromegaly present. No thyroid tenderness. Vascular: No JVD. Trachea: Trachea and phonation normal. No tracheal deviation. Cardiovascular:      Rate and Rhythm: Normal rate and regular rhythm. Heart sounds: No murmur heard. No friction rub. No gallop. Pulmonary:      Effort: Pulmonary effort is normal. No respiratory distress. Breath sounds: Normal breath sounds. No wheezing or rales. Abdominal:      General: Bowel sounds are normal. There is no distension. Palpations: Abdomen is soft. There is no mass. Tenderness: There is no abdominal tenderness. There is no guarding or rebound. Musculoskeletal:         General: Normal range of motion. Cervical back: Normal range of motion and neck supple. Lymphadenopathy:      Cervical: No cervical adenopathy. Skin:     General: Skin is warm and dry. Coloration: Skin is not pale. Findings: No erythema or rash.    Neurological:      Mental Status: She is alert and oriented to person, place, and time. Cranial Nerves: No cranial nerve deficit. Gait: Gait abnormal (uses cane). Psychiatric:         Behavior: Behavior normal.         Thought Content: Thought content normal.         Judgment: Judgment normal.            ASSESSMENT and PLAN  1. Multinodular goiter and Graves disease refractory to medical therapy, although she has symptom control on MMI. I explained about the anatomy and pathophysiology of thyroid nodules/disease. I explained about possible malignancy. I discussed FNA, observation, possible thyroid suppression pending FNA, and total thyroidectomy. Risks of surgery include bleeding (potentially requiring emergent exploration at bedside), infection, parathyroid injury/removal requiring supplemental calcium +/- vit d, recurrent laryngeal/superior laryngeal nerve injury resulting in vocal cord paralysis, voice changes and fatigue, aspiration, further surgery, need for lifelong thyroid supplementation.     2. Morbid obesity. Body mass index is 49.61 kg/m². Recommended diet modification and increased activity     3. Essential hypertension. Controlled on rx  4. CAD. Stable. Cleared for surgery by her cardiologist, Thaddeus Gresham MD, in [place], [place]     She desires a total thyroidectomy under general anesthesia with a NIM tube as an outpatient with an overnight stay     The patient was counseled at length about the risks of lowell Covid-19 in the anuj-operative and post-operative states including the recovery window of their procedure.  The patient was made aware that lowell Covid-19 after a surgical procedure may worsen their prognosis for recovering from the virus and lend to a higher morbidity and or mortality risk.  The patient was given the options of postponing their procedure. All of the risks, benefits, and alternatives were discussed.  The patient  wishes to proceed with the procedure.     Steve Maza MD FACS

## 2022-04-28 NOTE — OP NOTES
[address]  OPERATIVE REPORT    Name:  Amita Molina  MR#:  [id number]  :  1950  ACCOUNT #:  [de-identified]  DATE OF SERVICE:  2022    PREOPERATIVE DIAGNOSIS:  Graves disease refractory to medical therapy and very large multinodular goiter. POSTOPERATIVE DIAGNOSIS:  Graves disease refractory to medical therapy and very large multinodular goiter. PROCEDURE PERFORMED:  Total thyroidectomy. SURGEON:  Chanell Julien MD    ASSISTANT:   Chaitanya Perez. ANESTHESIA:  General.    COMPLICATIONS:  None. SPECIMENS REMOVED:  1. Question left superior parathyroid. 2.  Thyroid stitch at the right upper pole. IMPLANTS:  None. ESTIMATED BLOOD LOSS:  50 mL. DRAINS:  None. FINDINGS:  Extremely large multinodular goiter and with hypervascular component to it. There also has an extra nodule in the left upper pole, question whether it was accessory thyroid tissue or is parathyroid that is large enough that I removed it. BRIEF HISTORY:  The patient is a 24-year-old female with systemic graves disease refractory to medical therapy. Recently, she also has an enlarging goiter and symptoms associated with that. Options were discussed with her endocrinologist and it was felt that she would benefit from thyroidectomy. She understands the risks and benefits and wishes to proceed. PROCEDURE:  The patient was taken to the operating room and placed on the operating room table in the supine position, underwent general endotracheal anesthesia with nerve integrity monitoring tube and a roll was placed underneath the shoulders. The neck was placed in mild hyperextension and the arms are tucked and padded at the sides and the neck was prepped and draped in the usual sterile fashion after placement of the lead for the NIM monitor.   After appropriate time-out and antibiotics were given, a 6 cm incision was made along the Leonora's lines in the lower neck and the subcutaneous tissue was dissected out with electrocautery. We went through the platysma and subplatysmal flaps were developed superiorly. The thyroid cartilage was inferiorly in the sternal notch. There was a large central component that made it more challenging and there was a bleeding vein associated with the strap muscles. It was ligated with the platysmal flap and this was ligated and divided. We then were able to incise in the midline and get down to the thyroid itself and mobilized the upper portion and superior large central portion as well as worked on the left side. Silk ties, LigaSure and clips were placed on the vascular channel going in and out inferiorly and superiorly, identified the inferior parathyroid and gently, cautiously teased that off. The left superior was ensured it was thyroid nodule, it was more like an abnormal parathyroid. We went ahead and excised that, sent it separately. but not for frozen. Then we cautiously dissected the thyroid up off the ligament of Berry and up off the trachea. The recurrent laryngeal nerve was identified and sparred. I then took down both the pyramidal lobe that was quite large and taken down as well with 2-0 silk to ligate the superior extent of the vascular supply. Then we worked on the right side and similarly worked to get the right side up. Once we it was all removed, stitch was placed at the upper pole. It was extremely large, extremely vascular, we did several Valsalva to make sure there is no evidence of any ongoing bleeding. We placed a piece of Surgicel on both sides of the neck that required large space. Then interrupted 2-0 Vicryl was used to approximate the strap muscle, interrupted 3-0 Vicryl was used to approximate the platysma and more Marcaine was infiltrated after that a running 4-0 Vicryl was used to close the skin and a Dermabond dressing was applied.   Upon completion of the procedure, the needle, sponge, and instrument counts were correct x2.  The patient had tolerated the procedure well and was brought to recovery room.         Hiro Martinez MD      [id number]  D:  04/28/2022 13:56  T:  04/28/2022 17:47  JOB #:  [id number]  CC:  Gertie Kawasaki, MD Sherral Agee, MD

## 2022-04-28 NOTE — BRIEF OP NOTE
Brief Postoperative Note    Patient: Tricia Gurrola  YOB: 1950  MRN: [id number]    Date of Procedure: 4/28/2022     Pre-Op Diagnosis: GRAVES DISEASE    Post-Op Diagnosis: Same as preoperative diagnosis.       Procedure(s):  TOTAL THYROIDECTOMY WITH NIM    Surgeon(s):  Francisco Elliott MD    Surgical Assistant: Surg Asst-1: Stacy Dorado    Anesthesia: General     Estimated Blood Loss (mL): less than 50     Complications: None    Specimens:   ID Type Source Tests Collected by Time Destination   1 : left superior parathyroid Preservative Thyroid  Francisco Elliott MD 4/28/2022 1221 Pathology   2 : Thyroid, stitch at right upper pole Preservative Thyroid  Francisco Elliott MD 4/28/2022 1306 Pathology        Implants: * No implants in log *    Drains: * No LDAs found *    Findings: large multiple nodular goiter, hypervascular    Electronically Signed by Reji Villa MD on 4/28/2022 at 1:49 PM

## 2022-04-28 NOTE — ANESTHESIA PREPROCEDURE EVALUATION
Anesthetic History   No history of anesthetic complications            Review of Systems / Medical History  Patient summary reviewed, nursing notes reviewed and pertinent labs reviewed    Pulmonary  Within defined limits      Sleep apnea: CPAP           Neuro/Psych         TIA     Cardiovascular    Hypertension        Dysrhythmias   Hyperlipidemia    Exercise tolerance: <4 METS  Comments: EKG 2017  Sinus bradycardia with premature atrial complexes    GI/Hepatic/Renal  Within defined limits              Endo/Other      Hypothyroidism  Obesity, morbid obesity and arthritis     Other Findings   Comments: Mutlinodular goiter         Physical Exam    Airway  Mallampati: II  TM Distance: > 6 cm  Neck ROM: normal range of motion   Mouth opening: Normal     Cardiovascular  Regular rate and rhythm,  S1 and S2 normal,  no murmur, click, rub, or gallop             Dental  No notable dental hx       Pulmonary  Breath sounds clear to auscultation               Abdominal  GI exam deferred       Other Findings            Anesthetic Plan    ASA: 3  Anesthesia type: general    Monitoring Plan: BIS      Induction: Intravenous  Anesthetic plan and risks discussed with: Patient      Previous airway: Grade 1 view with Conway 2 blade. No evidence of airway compromise from goiter, no evidence of tracheal deviation.  Not experiencing obstructive symptoms

## 2022-04-29 VITALS
BODY MASS INDEX: 49.23 KG/M2 | HEART RATE: 66 BPM | TEMPERATURE: 97.6 F | SYSTOLIC BLOOD PRESSURE: 147 MMHG | WEIGHT: 288.36 LBS | HEIGHT: 64 IN | OXYGEN SATURATION: 94 % | DIASTOLIC BLOOD PRESSURE: 85 MMHG | RESPIRATION RATE: 17 BRPM

## 2022-04-29 LAB
ANION GAP SERPL CALC-SCNC: 3 MMOL/L (ref 5–15)
BUN SERPL-MCNC: 13 MG/DL (ref 6–20)
BUN/CREAT SERPL: 16 (ref 12–20)
CALCIUM SERPL-MCNC: 8.7 MG/DL (ref 8.5–10.1)
CHLORIDE SERPL-SCNC: 111 MMOL/L (ref 97–108)
CO2 SERPL-SCNC: 26 MMOL/L (ref 21–32)
CREAT SERPL-MCNC: 0.83 MG/DL (ref 0.55–1.02)
GLUCOSE SERPL-MCNC: 108 MG/DL (ref 65–100)
POTASSIUM SERPL-SCNC: 4.2 MMOL/L (ref 3.5–5.1)
SODIUM SERPL-SCNC: 140 MMOL/L (ref 136–145)

## 2022-04-29 PROCEDURE — 74011250637 HC RX REV CODE- 250/637: Performed by: SURGERY

## 2022-04-29 PROCEDURE — 60240 REMOVAL OF THYROID: CPT | Performed by: SURGERY

## 2022-04-29 PROCEDURE — 80048 BASIC METABOLIC PNL TOTAL CA: CPT

## 2022-04-29 PROCEDURE — 60500 EXPLORE PARATHYROID GLANDS: CPT | Performed by: SURGERY

## 2022-04-29 PROCEDURE — 36415 COLL VENOUS BLD VENIPUNCTURE: CPT

## 2022-04-29 PROCEDURE — 74011250636 HC RX REV CODE- 250/636: Performed by: NURSE ANESTHETIST, CERTIFIED REGISTERED

## 2022-04-29 RX ORDER — ACETAMINOPHEN AND CODEINE PHOSPHATE 300; 30 MG/1; MG/1
1 TABLET ORAL
Qty: 18 TABLET | Refills: 0 | Status: SHIPPED | OUTPATIENT
Start: 2022-04-29 | End: 2022-04-29 | Stop reason: SDUPTHER

## 2022-04-29 RX ORDER — ACETAMINOPHEN AND CODEINE PHOSPHATE 300; 30 MG/1; MG/1
1 TABLET ORAL
Qty: 18 TABLET | Refills: 0 | Status: SHIPPED | OUTPATIENT
Start: 2022-04-29 | End: 2022-05-02

## 2022-04-29 RX ADMIN — LOSARTAN POTASSIUM 100 MG: 100 TABLET, FILM COATED ORAL at 09:36

## 2022-04-29 RX ADMIN — HYDRALAZINE HYDROCHLORIDE 100 MG: 50 TABLET, FILM COATED ORAL at 09:37

## 2022-04-29 RX ADMIN — FUROSEMIDE 40 MG: 40 TABLET ORAL at 09:36

## 2022-04-29 RX ADMIN — OXYCODONE AND ACETAMINOPHEN 1 TABLET: 5; 325 TABLET ORAL at 08:20

## 2022-04-29 RX ADMIN — POTASSIUM CHLORIDE 20 MEQ: 20 TABLET, EXTENDED RELEASE ORAL at 09:36

## 2022-04-29 RX ADMIN — Medication 1 AMPULE: at 09:37

## 2022-04-29 RX ADMIN — ISOSORBIDE MONONITRATE 30 MG: 30 TABLET, EXTENDED RELEASE ORAL at 09:36

## 2022-04-29 NOTE — PROGRESS NOTES
DISCHARGE NOTE FROM [facility] NURSE    Patient determined to be stable for discharge by attending provider. I have reviewed the discharge instructions and follow-up appointments with the patient. They verbalized understanding and all questions were answered to their satisfaction. No complaints or further questions were expressed. Medications sent to pharmacy. Appropriate educational materials and medication side effect teaching were provided. PIV were removed prior to discharge. Patient did not discharge with any line, edge, or drain. All personal items collected during admission were returned to the patient prior to discharge.     Post-op patient: Dinorah Phillips

## 2022-04-29 NOTE — PROGRESS NOTES
Problem: Falls - Risk of  Goal: *Absence of Falls  Description: Document Yuli Strickland Fall Risk and appropriate interventions in the flowsheet. 4/29/2022 1349 by Laura GRESHAM  Outcome: Resolved/Not Met  4/29/2022 1041 by Gaby Warner  Outcome: Progressing Towards Goal  Note: Fall Risk Interventions:  Mobility Interventions: Bed/chair exit alarm         Medication Interventions: Bed/chair exit alarm                   Problem: Patient Education: Go to Patient Education Activity  Goal: Patient/Family Education  4/29/2022 1349 by Gaby Warner  Outcome: Resolved/Not Met  4/29/2022 1041 by Gaby Warner  Outcome: Progressing Towards Goal     Problem: Surgical Wound Care  Goal: *Non-infected Wound: Absence of infection signs and symptoms  Description: Infection control procedures (eg: clean dressings, clean gloves, hand washing, precautions to isolate wound from contamination, sterile instruments used for wound debridement) should be implemented.   4/29/2022 1349 by Laura GRESHAM  Outcome: Resolved/Not Met  4/29/2022 1041 by Gaby Warner  Outcome: Progressing Towards Goal  Goal: *Improvement of existing wound and maintenance of skin integrity  4/29/2022 1349 by Gaby Warner  Outcome: Resolved/Not Met  4/29/2022 1041 by Gaby Warner  Outcome: Progressing Towards Goal     Problem: Patient Education: Go to Patient Education Activity  Goal: Patient/Family Education  4/29/2022 1349 by Gaby Warner  Outcome: Resolved/Not Met  4/29/2022 1041 by Gaby Warner  Outcome: Progressing Towards Goal

## 2022-04-29 NOTE — PROGRESS NOTES
End of Shift Note    Bedside shift change report given to Lam Walters (oncoming nurse) by Dwight Leggett RN (offgoing nurse). Report included the following information SBAR, Kardex, Intake/Output and MAR    Shift worked:  7p-7a     Shift summary and any significant changes:     Pt rested this shift. Pt received medication for pain-- see MAR  Pt ambulated to bathroom with assistance   Concerns for physician to address:  None     Zone phone for oncoming shift:   [phone]       Activity:  Activity Level: Up with Assistance  Number times ambulated in hallways past shift: 0  Number of times OOB to chair past shift: 0    Cardiac:   Cardiac Monitoring: No      Cardiac Rhythm: Atrial Fib    Access:   Current line(s): PIV     Genitourinary:   Urinary status: voiding    Respiratory:   O2 Device: Nasal cannula  Incentive spirometer at bedside: YES       GI:     Current diet:  ADULT DIET Regular  Tolerating current diet: YES       Pain Management:   Patient states pain is manageable on current regimen: YES    Skin:  Johann Score: 23  Interventions: increase time out of bed    Patient Safety:  Fall Score:  Total Score: 2  Interventions: assistive device (walker, cane, etc), gripper socks and pt to call before getting OOB       Length of Stay:  Expected LOS: - - -  Actual LOS: 0      Dwight Leggett RN

## 2022-04-29 NOTE — PROGRESS NOTES
Problem: Falls - Risk of  Goal: *Absence of Falls  Description: Document Oralia Harris Fall Risk and appropriate interventions in the flowsheet. Outcome: Progressing Towards Goal  Note: Fall Risk Interventions:  Mobility Interventions: Bed/chair exit alarm         Medication Interventions: Bed/chair exit alarm                   Problem: Patient Education: Go to Patient Education Activity  Goal: Patient/Family Education  Outcome: Progressing Towards Goal     Problem: Surgical Wound Care  Goal: *Non-infected Wound: Absence of infection signs and symptoms  Description: Infection control procedures (eg: clean dressings, clean gloves, hand washing, precautions to isolate wound from contamination, sterile instruments used for wound debridement) should be implemented.   Outcome: Progressing Towards Goal  Goal: *Improvement of existing wound and maintenance of skin integrity  Outcome: Progressing Towards Goal     Problem: Patient Education: Go to Patient Education Activity  Goal: Patient/Family Education  Outcome: Progressing Towards Goal

## 2022-04-29 NOTE — PROGRESS NOTES
Admit Date: 2022    POD 1 Day Post-Op    Procedure:  Procedure(s):  TOTAL THYROIDECTOMY WITH NIM      Assessment:   Active Problems:    Graves disease (3/21/2022)      1. Feels ok but some pain  2. No dysphonia  3. No paresthesias  4. Morning calcium not done yet  5. Hypoxia. Requiring supplemental oxygen      Plan/Recommendations/Medical Decision Makin. Up in chair  2. Pulmonary toilet-Gave incentive spirometer and educated patient (not done as ordered yesterday)  3. Await calcium level  4. Hopefully home later today    Subjective:     Patient has complaints of pain. Objective:     Blood pressure (!) 146/84, pulse 64, temperature 98.3 °F (36.8 °C), resp. rate 17, height 5' 4\" (1.626 m), weight 130.8 kg (288 lb 5.8 oz), SpO2 100 %, not currently breastfeeding. Temp (24hrs), Av.1 °F (36.7 °C), Min:97.4 °F (36.3 °C), Max:98.9 °F (37.2 °C)      Physical Exam:  NECK:   Supple min swelling, incision CDI, voice ok Cvosek negative, LUNG: clear to auscultation bilaterally, HEART: regular rate and rhythm, S1, S2 normal, no murmur, click, rub or gallop, ABDOMEN: soft, non-tender.  Bowel sounds normal. No masses,  no organomegaly    Labs:   Recent Results (from the past 48 hour(s))   CALCIUM    Collection Time: 22  6:11 PM   Result Value Ref Range    Calcium 8.9 8.5 - 10.1 MG/DL       Data Review images and reports reviewed

## 2022-05-06 ENCOUNTER — TELEPHONE (OUTPATIENT)
Dept: SURGERY | Age: 72
End: 2022-05-06

## 2022-05-06 NOTE — TELEPHONE ENCOUNTER
Path is a benign    1. Left superior parathyroid; parathyroidectomy:        Hypercellular parathyroid gland (1.3 g). 2. Thyroid; total thyroidectomy:        Follicular adenoma with Hurthle cell features (1.8 cm in greatest   dimension); (see Comment). Background of benign thyroid follicular hyperplasia. Resection margins are negative for tumor. Comment:   The largest isthmic nodule appears well-circumscribed and shows   architectural and cytological features that are different from the   surrounding gland. The cells are cuboidal to low columnar and with Hurthle   cell features. The nuclei appear enlarged and with chromatin clearing. However, they are round, with no significant overlap, occasional grooves   and no nuclear pseudoinclusions identified. No definitive capsular or   vascular invasion is noted. Based on these findings, the lesion is best   classified as follicular adenoma with Hurthle cell features.        She is feeling well    Murray Watt MD FACS

## 2022-05-09 ENCOUNTER — OFFICE VISIT (OUTPATIENT)
Dept: SURGERY | Age: 72
End: 2022-05-09
Payer: COMMERCIAL

## 2022-05-09 VITALS
SYSTOLIC BLOOD PRESSURE: 118 MMHG | TEMPERATURE: 97.3 F | BODY MASS INDEX: 48.79 KG/M2 | RESPIRATION RATE: 20 BRPM | HEART RATE: 69 BPM | HEIGHT: 64 IN | DIASTOLIC BLOOD PRESSURE: 68 MMHG | WEIGHT: 285.8 LBS | OXYGEN SATURATION: 99 %

## 2022-05-09 DIAGNOSIS — Z09 POSTOPERATIVE EXAMINATION: Primary | ICD-10-CM

## 2022-05-09 PROCEDURE — 99024 POSTOP FOLLOW-UP VISIT: CPT | Performed by: SURGERY

## 2022-05-09 NOTE — PROGRESS NOTES
Identified pt with two pt identifiers(name and ). Reviewed record in preparation for visit and have obtained necessary documentation. All patient medications has been reviewed. Chief Complaint   Patient presents with    Surgical Follow-up     TOTAL THYROIDECTOMY WITH NIM 22       Health Maintenance Due   Topic    Hepatitis C Screening     Depression Screen     COVID-19 Vaccine (1)    Lipid Screen     DTaP/Tdap/Td series (1 - Tdap)    Colorectal Cancer Screening Combo     Shingrix Vaccine Age 50> (1 of 2)    Breast Cancer Screen Mammogram     Bone Densitometry (Dexa) Screening     Pneumococcal 65+ years (1 - PCV)       Vitals:    22 0922   BP: 118/68   Pulse: 69   Resp: 20   Temp: 97.3 °F (36.3 °C)   TempSrc: Temporal   SpO2: 99%   Weight: 129.6 kg (285 lb 12.8 oz)   Height: 5' 4\" (1.626 m)   PainSc:   0 - No pain       4. Have you been to the ER, urgent care clinic since your last visit? Hospitalized since your last visit? No    5. Have you seen or consulted any other health care providers outside of the [address] [place] [address] since your last visit? Include any pap smears or colon screening. No      Patient is accompanied by daughter I have received verbal consent from Tricia Gurrola to discuss any/all medical information while they are present in the room.

## 2022-05-09 NOTE — PROGRESS NOTES
Surgery  Follow up  Procedure: total thyroidectomy and left superior parathyroidectomy  OR date:  4/28/2022  Path:    1. Left superior parathyroid; parathyroidectomy:        Hypercellular parathyroid gland (1.3 g). 2. Thyroid; total thyroidectomy:        Follicular adenoma with Hurthle cell features (1.8 cm in greatest   dimension); (see Comment). Background of benign thyroid follicular hyperplasia. Resection margins are negative for tumor. Comment:   The largest isthmic nodule appears well-circumscribed and shows   architectural and cytological features that are different from the   surrounding gland. The cells are cuboidal to low columnar and with Hurthle   cell features. The nuclei appear enlarged and with chromatin clearing. However, they are round, with no significant overlap, occasional grooves   and no nuclear pseudoinclusions identified. No definitive capsular or   vascular invasion is noted. Based on these findings, the lesion is best   classified as follicular adenoma with Hurthle cell features. S I feel fine, no paresthesias, voice ok,     Visit Vitals  /68 (BP 1 Location: Left upper arm, BP Patient Position: Sitting)   Pulse 69   Temp 97.3 °F (36.3 °C) (Temporal)   Resp 20   Ht 5' 4\" (1.626 m)   Wt 129.6 kg (285 lb 12.8 oz)   SpO2 99%   BMI 49.06 kg/m²       O Incisions healing well without infection   Moderate swelling centrally ?superficial hematoma/seroma    A/P Doing well overall.   Swallowing better   Check labs per Dr Subhash Lim in 6 weeks   RTC 6 weeks     Arnulfo Sheldon MD FACS

## 2022-05-09 NOTE — LETTER
5/9/2022    Patient: Familia Bermeo   YOB: 1950   Date of Visit: 5/9/2022     Amado Perez MD  [address] [facility] [address] [facility] [place]    Dear Amado Perez MD,      Thank you for referring Ms. Chris Killian to  [facility]  for evaluation. My notes for this consultation are attached. If you have questions, please do not hesitate to call me. I look forward to following your patient along with you.       Sincerely,    Delroy Ortega MD

## 2022-06-02 ENCOUNTER — TELEPHONE (OUTPATIENT)
Dept: SURGERY | Age: 72
End: 2022-06-02

## 2022-06-02 NOTE — TELEPHONE ENCOUNTER
Spoke with patient who requested we call her back in the morning to schedule her with Dr. Lasha Soria.

## 2022-06-02 NOTE — TELEPHONE ENCOUNTER
Patient called in wanting to see if she can see Dr. Gabriele Louise for another hernia surgery. Patient states she was told she has \"hernias in her stomach-one big one and one little one\". Patient wants to know if she can see him on the week of June 13 as she will be back in [facility] to see Dr. Leonora Singer. She is aware that Dr. Leonora Singer does hernia surgeries as well. Patient did seem a bit confused.

## 2022-06-03 NOTE — TELEPHONE ENCOUNTER
Left a message for patient to call back to schedule an appointment with Dr. James Nolen for abdominal hernias the week on June 13th.

## 2022-06-06 DIAGNOSIS — E05.00 GRAVES DISEASE: ICD-10-CM

## 2022-06-07 LAB
BUN SERPL-MCNC: 22 MG/DL (ref 8–27)
BUN/CREAT SERPL: 29 (ref 12–28)
CALCIUM SERPL-MCNC: 9.2 MG/DL (ref 8.7–10.3)
CHLORIDE SERPL-SCNC: 102 MMOL/L (ref 96–106)
CO2 SERPL-SCNC: 23 MMOL/L (ref 20–29)
CREAT SERPL-MCNC: 0.77 MG/DL (ref 0.57–1)
EGFR: 83 ML/MIN/1.73
GLUCOSE SERPL-MCNC: 90 MG/DL (ref 65–99)
POTASSIUM SERPL-SCNC: 5.1 MMOL/L (ref 3.5–5.2)
SODIUM SERPL-SCNC: 139 MMOL/L (ref 134–144)
T4 FREE SERPL-MCNC: 2.13 NG/DL (ref 0.82–1.77)
TSH SERPL DL<=0.005 MIU/L-ACNC: 2.15 UIU/ML (ref 0.45–4.5)

## 2022-06-13 ENCOUNTER — OFFICE VISIT (OUTPATIENT)
Dept: ENDOCRINOLOGY | Age: 72
End: 2022-06-13

## 2022-06-13 ENCOUNTER — OFFICE VISIT (OUTPATIENT)
Dept: SURGERY | Age: 72
End: 2022-06-13
Payer: COMMERCIAL

## 2022-06-13 VITALS
BODY MASS INDEX: 48.36 KG/M2 | WEIGHT: 283.3 LBS | DIASTOLIC BLOOD PRESSURE: 69 MMHG | HEART RATE: 72 BPM | HEIGHT: 64 IN | SYSTOLIC BLOOD PRESSURE: 107 MMHG

## 2022-06-13 VITALS
TEMPERATURE: 97.3 F | BODY MASS INDEX: 48.32 KG/M2 | RESPIRATION RATE: 20 BRPM | DIASTOLIC BLOOD PRESSURE: 74 MMHG | SYSTOLIC BLOOD PRESSURE: 128 MMHG | WEIGHT: 283 LBS | OXYGEN SATURATION: 98 % | HEART RATE: 71 BPM | HEIGHT: 64 IN

## 2022-06-13 DIAGNOSIS — E89.0 POST-SURGICAL HYPOTHYROIDISM: Primary | ICD-10-CM

## 2022-06-13 DIAGNOSIS — Z09 POSTOPERATIVE EXAMINATION: Primary | ICD-10-CM

## 2022-06-13 PROCEDURE — 1123F ACP DISCUSS/DSCN MKR DOCD: CPT | Performed by: INTERNAL MEDICINE

## 2022-06-13 PROCEDURE — 99024 POSTOP FOLLOW-UP VISIT: CPT | Performed by: SURGERY

## 2022-06-13 PROCEDURE — 99215 OFFICE O/P EST HI 40 MIN: CPT | Performed by: INTERNAL MEDICINE

## 2022-06-13 RX ORDER — LEVOTHYROXINE SODIUM 50 UG/1
50 TABLET ORAL
Qty: 28 TABLET | Refills: 0 | Status: SHIPPED | COMMUNITY
Start: 2022-06-13 | End: 2022-07-06 | Stop reason: ALTCHOICE

## 2022-06-13 RX ORDER — LEVOTHYROXINE SODIUM 100 UG/1
100 TABLET ORAL
Qty: 28 TABLET | Refills: 0 | Status: SHIPPED | COMMUNITY
Start: 2022-06-13 | End: 2022-07-06 | Stop reason: ALTCHOICE

## 2022-06-13 NOTE — PROGRESS NOTES
Identified pt with two pt identifiers(name and ). Reviewed record in preparation for visit and have obtained necessary documentation. All patient medications has been reviewed. Chief Complaint   Patient presents with    Surgical Follow-up     s/p TOTAL THYROIDECTOMY WITH NIM on 22       Health Maintenance Due   Topic    Hepatitis C Screening     Depression Screen     Lipid Screen     DTaP/Tdap/Td series (1 - Tdap)    Colorectal Cancer Screening Combo     Shingrix Vaccine Age 50> (1 of 2)    Breast Cancer Screen Mammogram     Bone Densitometry (Dexa) Screening     Pneumococcal 65+ years (1 - PCV)       Vitals:    22 1021   BP: 128/74   Pulse: 71   Resp: 20   Temp: 97.3 °F (36.3 °C)   TempSrc: Temporal   SpO2: 98%   Weight: 128.4 kg (283 lb)   Height: 5' 4\" (1.626 m)   PainSc:   0 - No pain       4. Have you been to the ER, urgent care clinic since your last visit? Hospitalized since your last visit? 2022 Admitted for bowel blockage and hernia x2    5. Have you seen or consulted any other health care providers outside of the [address] [place] [address] since your last visit? Include any pap smears or colon screening. No      Patient is accompanied by daughter I have received verbal consent from Lenny Valverde to discuss any/all medical information while they are present in the room.

## 2022-06-13 NOTE — PROGRESS NOTES
Chief Complaint   Patient presents with    Thyroid Problem     pcp and pharmacy confirmed     History of Present Illness: Kiley Ward is a 73 y.o. female here for follow up of thyroid. Takes the levothyroxine first thing in the morning with just water and waits at least 30 minutes before eating and hasn't missed any doses since surgery on 4/28/22  Did not have an low calcium levels after the surgery. Not currently taking any vitamins. Overall energy is doing well after the surgery. Sometimes can have a hot or cold flash but mostly is staying comfortable. Has had 3-4 times where she has gotten the munchies around 8pm after having dinner at 5pm and just drank some water and had an apple and this resolved. She was admitted over [facility] for an intestinal blockage but it was treated conservatively and has an appt tomorrow with Dr. Jose E Edwards who previously did surgery on her in 2017. No hair loss or brittle nails. Some dry skin that is unchanged. Weight is down 5 lbs since surgery. Willing to try brand unithroid to see if she feels any better with this. Willing to f/u with PCP going forward now that she is no longer hyperthyroid. Current Outpatient Medications   Medication Sig    levothyroxine (SYNTHROID) 150 mcg tablet Take 1 Tablet by mouth Daily (before breakfast). 30 min before breakfast.  Start on 4/29/22 after thyroid surgery on 4/28/22.--delete methimazole from profile after 4/28/22. Xarelto 20 mg tab tablet TAKE 1 TABLET BY MOUTH ONCE DAILY WITH EVENING MEAL    atorvastatin (LIPITOR) 40 mg tablet TAKE 1 TABLET BY MOUTH AT BEDTIME    hydrALAZINE (APRESOLINE) 100 mg tablet TAKE 1 TABLET BY MOUTH THREE TIMES DAILY WITH FOOD    isosorbide mononitrate ER (IMDUR) 30 mg tablet TAKE 1 TABLET BY MOUTH ONCE DAILY    aspirin delayed-release 81 mg tablet Take 81 mg by mouth daily.     losartan (COZAAR) 100 mg tablet TAKE 1 TABLET BY MOUTH ONCE DAILY. (D C LOSARTAN HCTZ)    furosemide (LASIX) 40 mg tablet Take 40-80 mg by mouth daily. potassium chloride (K-DUR, KLOR-CON) 20 mEq tablet daily. ACETAMINOPHEN (TYLENOL PO) Take  by mouth as needed. No current facility-administered medications for this visit. Allergies   Allergen Reactions    Tobramycin Swelling     Review of Systems: PER HPI    Physical Examination:  Blood pressure 107/69, pulse 72, height 5' 4\" (1.626 m), weight 283 lb 4.8 oz (128.5 kg). General: pleasant, no distress, good eye contact   Neck: well healed inferior neck incision  Cardiovascular: regular, normal rate, nl s1 and s2, no m/r/g   Respiratory: clear to auscultation bilaterally   Integumentary: skin is normal, no edema  Neurological: reflexes 2+ at biceps, no tremors  Psychiatric: normal mood and affect    Data Reviewed:   Component      Latest Ref Rng & Units 6/6/2022 6/6/2022 6/6/2022          12:00 AM 12:00 AM 12:00 AM   Glucose      65 - 99 mg/dL   90   BUN      8 - 27 mg/dL   22   Creatinine      0.57 - 1.00 mg/dL   0.77   eGFR      >59 mL/min/1.73   83   BUN/Creatinine ratio      12 - 28   29 (H)   Sodium      134 - 144 mmol/L   139   Potassium      3.5 - 5.2 mmol/L   5.1   Chloride      96 - 106 mmol/L   102   CO2      20 - 29 mmol/L   23   Calcium      8.7 - 10.3 mg/dL   9.2   T4, Free      0.82 - 1.77 ng/dL  2.13 (H)    TSH      0.450 - 4.500 uIU/mL 2.150         Assessment/Plan:     1. Post-surgical hypothyroidism: s/p total thyroidectomy on 4/28/22 with Dr. Mora Cranker for Grave's disease that did not go into remission despite 25 mg of methimazole daily for 2.5 years  I started levothyroxine 150 mcg daily after surgery and TSH 2.15 in 6/22. She feels well but will try brand unithroid to see if she feels any better and if she does, then will stay on brand unithroid but if she doesn't then she can stay on generic. She can f/u with PCP going forward but I'm happy to assist with any dose adjustments in the future.   - trial of unithroid 100 + 50 mcg 1 tab of each daily for 4 weeks and if feeling better then change to brand unithroid 150 mcg daily. - check TSH and free T4 6-8 weeks after starting unithroid if she remains on this; if she goes back to generic, then can have labs repeated in 4-6 months        Patient Instructions   1) TSH is a thyroid test.  Your level is 2.15 which is normal but just rory goal of 0.45-2.0. This test goes opposite of your thyroid dose and suggests your dose of levothyroxine is likely adequate but because of the variability with generic, sometimes your level will fluctuate and you will feel better with brand medication. Your free T4 was slightly high at 2.13 but I'm not concerned about this as often this will go up if you have taken your thyroid pill within 12 hours of having your labs drawn. We will try brand unithroid at the same dose of 150 mcg daily for the next 4 weeks. You will take one of the 100 + one of the 50 mcg tabs every morning. If you are feeling better after 3 weeks, then let me know and I will send the 150 mcg tabs to your pharmacy and if we need to do a prior authorization, we'll have a week to get this approved. 2) BUN and creatinine are markers of kidney function. Your values are normal.    3) Your electrolytes (sodium, potassium, and calcium) are all normal.  Your glucose (blood sugar) is normal.    4) If you end up staying on the brand medication, plan on repeating your labs 6-8 weeks after starting unithroid to ensure your level is normal.    5) Going forward I will have you just follow up with your PCP and if anything worsens in the future, I'm happy to see you back. Feel free to send me a message through [address] or call me at [phone] if you have any questions or concerns in the future. Follow-up and Dispositions    Return if symptoms worsen or fail to improve.          I spent a total of 41 minutes in both face-to-face and in non-face-to-face activities for the visit on the date of this encounter. Copy sent to:  Dr. Tl Patrick phone: [phone], fax: [phone]  Dr. Chani Membreno    Lab follow up: 08/18/22    Sent her the following message through [address] and in a letter:    Resulted Orders   T4, FREE   Result Value Ref Range    T4, Free 1.81 (H) 0.82 - 1.77 ng/dL    Narrative    Performed at:  [address] [facility]  [id number]  : Deep Hughes MD, Phone:  [phone]   TSH 3RD GENERATION   Result Value Ref Range    TSH 0.635 0.450 - 4.500 uIU/mL    Narrative    Performed at:  [address] [facility]  [id number]  : Deep Hughes MD, Phone:  [phone]       TSH is a thyroid test.  Your level is 0.63 which is normal and at goal of 0.45-2.0. This test goes opposite of your thyroid dose and suggests your dose of unithroid is perfect so I will keep your dose the same.  -------------------------------------------------------------------------------------------------------------------  Your free T4 was slightly high at 1.81 but I'm not concerned about this as often this will go up if you have taken your thyroid pill within 12 hours of having your labs drawn. Lab follow up: 11/11/22    Sent her the following message in a letter and through [employer]:    Resulted Orders   T4, FREE   Result Value Ref Range    T4, Free 1.72 0.82 - 1.77 ng/dL    Narrative    Performed at:  [address]  [id number]  : Deep Hughes MD, Phone:  [phone]   TSH 3RD GENERATION   Result Value Ref Range    TSH 0.970 0.450 - 4.500 uIU/mL    Narrative    Performed at:  [address]  [address]  [id number]  : Deep Hughes MD, Phone:  [phone]       TSH is a thyroid test.  Your level is 0.97 which is normal and at goal of 0.45-2.0.   This test goes opposite of your thyroid dose and suggests your dose of unithroid is perfect so I will keep your dose the same.  -------------------------------------------------------------------------------------------------------------------  You can just have your labs repeated by your PCP going forward as everything is stable.

## 2022-06-13 NOTE — PROGRESS NOTES
Surgery  Follow up  Procedure: total thyroidectomy and left superior parathyroidectomy  OR date:  4/28/2022  Path:    1. Left superior parathyroid; parathyroidectomy:        Hypercellular parathyroid gland (1.3 g). 2. Thyroid; total thyroidectomy:        Follicular adenoma with Hurthle cell features (1.8 cm in greatest   dimension); (see Comment). Background of benign thyroid follicular hyperplasia. Resection margins are negative for tumor. Comment:   The largest isthmic nodule appears well-circumscribed and shows   architectural and cytological features that are different from the   surrounding gland. The cells are cuboidal to low columnar and with Hurthle   cell features. The nuclei appear enlarged and with chromatin clearing. However, they are round, with no significant overlap, occasional grooves   and no nuclear pseudoinclusions identified. No definitive capsular or   vascular invasion is noted. Based on these findings, the lesion is best   classified as follicular adenoma with Hurthle cell features.      S I feel fine, no paresthesias, voice ok,     Visit Vitals  /74 (BP 1 Location: Left lower arm, BP Patient Position: Sitting)   Pulse 71   Temp 97.3 °F (36.3 °C) (Temporal)   Resp 20   Ht 5' 4\" (1.626 m)   Wt 128.4 kg (283 lb)   SpO2 98%   BMI 48.58 kg/m²       O Incisions healing well without infection   Minimal dysphonia    6/6/2022  Free T4 2.13  TSH 2.50  Calcium 9.2    A/P Doing well from thyroidectomy   Recent admission for SBO at [facility] and has recurrent herniae and is seeing Dr Estrella Lazo from [facility] for potential repair   She is having post menopausal vaginal bleeding as well and is planning D and C   She has f/u with Dr Macy White later today   RTC prn    Desiree Buitrago MD FACS

## 2022-06-13 NOTE — LETTER
6/13/2022    Patient: Maxim Guallpa   YOB: 1950   Date of Visit: 6/13/2022     Merary King MD  [address] [place] [address] [address] [place] [address]    Dear Merary iKng MD,      Thank you for referring Ms. Nisa Lynn to  [facility]  for evaluation. My notes for this consultation are attached. If you have questions, please do not hesitate to call me. I look forward to following your patient along with you.       Sincerely,    Parmjit Reed MD

## 2022-06-13 NOTE — PATIENT INSTRUCTIONS
1) TSH is a thyroid test.  Your level is 2.15 which is normal but just rory goal of 0.45-2.0. This test goes opposite of your thyroid dose and suggests your dose of levothyroxine is likely adequate but because of the variability with generic, sometimes your level will fluctuate and you will feel better with brand medication. Your free T4 was slightly high at 2.13 but I'm not concerned about this as often this will go up if you have taken your thyroid pill within 12 hours of having your labs drawn. We will try brand unithroid at the same dose of 150 mcg daily for the next 4 weeks. You will take one of the 100 + one of the 50 mcg tabs every morning. If you are feeling better after 3 weeks, then let me know and I will send the 150 mcg tabs to your pharmacy and if we need to do a prior authorization, we'll have a week to get this approved. 2) BUN and creatinine are markers of kidney function. Your values are normal.    3) Your electrolytes (sodium, potassium, and calcium) are all normal.  Your glucose (blood sugar) is normal.    4) If you end up staying on the brand medication, plan on repeating your labs 6-8 weeks after starting unithroid to ensure your level is normal.    5) Going forward I will have you just follow up with your PCP and if anything worsens in the future, I'm happy to see you back. Feel free to send me a message through [address] or call me at [phone] if you have any questions or concerns in the future.

## 2022-06-14 ENCOUNTER — OFFICE VISIT (OUTPATIENT)
Dept: SURGERY | Age: 72
End: 2022-06-14
Payer: COMMERCIAL

## 2022-06-14 VITALS
OXYGEN SATURATION: 98 % | RESPIRATION RATE: 18 BRPM | DIASTOLIC BLOOD PRESSURE: 71 MMHG | HEIGHT: 64 IN | BODY MASS INDEX: 48.03 KG/M2 | TEMPERATURE: 98.2 F | WEIGHT: 281.3 LBS | HEART RATE: 55 BPM | SYSTOLIC BLOOD PRESSURE: 103 MMHG

## 2022-06-14 DIAGNOSIS — K56.609 SMALL BOWEL OBSTRUCTION (HCC): Primary | ICD-10-CM

## 2022-06-14 PROCEDURE — 99212 OFFICE O/P EST SF 10 MIN: CPT | Performed by: SURGERY

## 2022-06-14 PROCEDURE — 1123F ACP DISCUSS/DSCN MKR DOCD: CPT | Performed by: SURGERY

## 2022-06-14 NOTE — PROGRESS NOTES
Abdominal Hernia. 1. Have you been to the ER, urgent care clinic since your last visit? Hospitalized since your last visit? ER [facility] in [place] for vomiting. Found a bowel blockage and hernia. 2. Have you seen or consulted any other health care providers outside of the [address] [place] [address] since your last visit? Include any pap smears or colon screening.  Seen @ [facility].

## 2022-06-20 NOTE — PROGRESS NOTES
Reason for Visit:  Follow-up ventral hernia repair    Brief History:  74 yo woman with hx ventral hernia s/p repair presented to clinic for follow up small bowel obstruction from ventral hernia. Pt was recently admitted to [facility] for small bowel obstruction. She underwent non-operative management and bowel obstruction resolved. Pt does have recurrently ventral hernia defect. She currently denied any abdominal pain, nausea or vomiting. She is passing flatus and having BM.     Visit Vitals  /71 (BP 1 Location: Right arm, BP Patient Position: Sitting, BP Cuff Size: Adult long)   Pulse (!) 55   Temp 98.2 °F (36.8 °C) (Oral)   Resp 18   Ht 5' 4\" (1.626 m)   Wt 281 lb 4.8 oz (127.6 kg)   SpO2 98%   BMI 48.29 kg/m²         Physical Exam:    Gen:  NAD  Pulm:  Unlabored  Abd:  S/ND/Non-TTP/ Large midline periumbilical hernia which is reducible    AP:  74 yo woman with recent bowel obstruction from ventral hernia    - Ventral hernia:  Pt is currently asymptomatic and small bowel obstruction has resolved  - No acute indication for hernia repair  - Recommend patient continue to lose weight  - Follow up for hernia repair if it becomes symptomatic

## 2022-07-06 ENCOUNTER — TELEPHONE (OUTPATIENT)
Dept: ENDOCRINOLOGY | Age: 72
End: 2022-07-06

## 2022-07-06 RX ORDER — LEVOTHYROXINE SODIUM 150 UG/1
150 TABLET ORAL
Qty: 90 TABLET | Refills: 3 | Status: SHIPPED | OUTPATIENT
Start: 2022-07-06

## 2022-07-06 NOTE — TELEPHONE ENCOUNTER
7/6/2022    Pt called and left a vm at 10:48 am stating Dr. Yuan Dennis adv her to take the meds for 3 weeks and give him a call to let him know how the meds is going. Pt did not disclosed a call back number.      Thanks,   Dudley Do

## 2022-07-06 NOTE — TELEPHONE ENCOUNTER
Ms Cate Laureano stated she has not had a headache since being on Unithroid brand for 3 weeks and would like a prescription sent so she can check the price. Pt asked that medication be sent to [address] [place] [address] on file. Ms Cate Laureano also wanted to know if lab work will be needed soon?

## 2022-07-06 NOTE — TELEPHONE ENCOUNTER
Please clarify this message. She had been taking generic levothyroxine and I asked her to try brand unithroid for 3 weeks. If she doesn't notice a difference on the brand, she is welcome to stay on the generic but if she thinks the generic was giving her a headache and the brand didn't, then I can send brand unithroid 150 mcg tabs to her local pharmacy (she has been taking samples of 100+50 mcg tabs). Let me know what she wants to do. Thanks.

## 2022-07-06 NOTE — TELEPHONE ENCOUNTER
As long as she is feeling well on her current dose, she can have her PCP check her TSH and free T4 in the next 3-4 months.

## 2022-07-06 NOTE — TELEPHONE ENCOUNTER
7/6/2022  12:15 PM      Pt called and said  wanted her to let him know if there was a difference between 2 medications. Pt was taking unithroid and he wanted her to try levothyroxine. Pt stated she really don't see a difference. Pt stated the levothyroxine gives her a headache sometimes. Pt wanted to know which medication she should take from now on. #[phone]      Thanks,  Jacoby Colbert

## 2022-08-16 LAB
T4 FREE SERPL-MCNC: 1.81 NG/DL (ref 0.82–1.77)
TSH SERPL DL<=0.005 MIU/L-ACNC: 0.64 UIU/ML (ref 0.45–4.5)

## 2024-01-31 NOTE — CONSULTS
Infectious Disease Progress Note      Patient: Josette Nova Date: 2024   : 1951 Attending: Mable Delaney MD   72 year old female        Subjective:   Patient in no apparent distress.  Is currently on 2L of O2 per nasal cannula.  She is afebrile.  Has occasional productive cough. She denies abdominal pain on palpation.     Review of Systems:  Constitutional: Negative for chills and fever.   HENT: Negative for congestion, mouth sores and sore throat.    Eyes: Negative for visual disturbance.   Respiratory: Positive for cough and shortness of breath.    Cardiovascular: Negative for chest pain and palpitations.   Gastrointestinal: Negative for abdominal distention, abdominal pain, diarrhea, nausea and vomiting.   Genitourinary: Negative for difficulty urinating, dysuria, flank pain and frequency.   Musculoskeletal: Negative for back pain, joint swelling and myalgias.   Skin: Negative for rash.   Neurological: Negative for seizures, weakness and headaches.     Objective:  Vitals with min/max:      Vital Last Value 24 Hour Range   Temperature 96.6 °F (35.9 °C) (24 0505) Temp  Min: 96.1 °F (35.6 °C)  Max: 96.8 °F (36 °C)   Pulse 63 (24 0925) Pulse  Min: 55  Max: 67   Respiratory 19 (24 0505) Resp  Min: 19  Max: 22   Non-Invasive  Blood Pressure (!) 140/74 (24 0925) BP  Min: 117/65  Max: 150/84   Pulse Oximetry 96 % (24 0928) SpO2  Min: 96 %  Max: 99 %   Arterial   Blood Pressure   No data recorded          Physical Exam  Constitutional:       Appearance: In no acute distress.  HENT:      Head: Normocephalic and atraumatic.   Eyes:      Conjunctiva/sclera: Conjunctivae normal.      Cardiovascular:      Rate and Rhythm: Normal rate and regular rhythm.      Heart sounds: Normal heart sounds. No murmur heard.   Pulmonary:      Effort: No respiratory distress.      Breath sounds:  Diminished.  On 2L of O2  Abdominal:      General: Bowel sounds are normal.      Palpations:  Chief Complaint:  Incarcerated ventral hernia causing small bowel obstruction    HPI:  60 yo woman with hx obstructive sleep apnea, incisional hernia repair, HTN and morbid obesity who was transferred from [place] for incarcerated incisional hernia with bowel obstruction. Pt reported waking up at 2:00 am with abdominal pain, nausea and vomiting. Pt did have BM this morning. Pain has been to left of umbilicus and pt reported intermittent crampy pain. Pt had CT scan performed which showed small bowel obstruction secondary to incisional hernia. Past Medical History   Diagnosis Date    Arthritis     Endocrine disease      Thyroid nodule    Hypertension     Ill-defined condition      Obesity    Obesity     MELQUIADES on CPAP        Past Surgical History   Procedure Laterality Date    Hx orthopaedic       RTKR    Hx gyn       tubal ligation    Hx other surgical  12-12-13     PSBO with ventral hernia repair by DDDR. Zuleyka Harmon - [facility]    Hx hernia repair  5-19-14     open incisional hernia repair with underlay mesh and extensive  lysis of adhesions for 2 hours - [facility]- DR. Izquierdo       No current facility-administered medications on file prior to encounter. Current Outpatient Prescriptions on File Prior to Encounter   Medication Sig Dispense Refill    hydrocortisone (HYTONE) 2.5 % topical cream APPLY A THIN LAYER TOPICALLY  TO ABDOMEN  TWICE DAILY AS NEEDED 30 oz 0    ACETAMINOPHEN (TYLENOL PO) Take  by mouth.  docusate sodium (COLACE) 100 mg capsule Take 1 Cap by mouth daily. 30 Cap 2    HYDROmorphone (DILAUDID) 2 mg tablet Take 1 Tab by mouth every four (4) hours as needed. 40 Tab 0    telmisartan (MICARDIS) 80 mg tablet Take 80 mg by mouth daily.  hydrochlorothiazide (MICROZIDE) 12.5 mg capsule Take 12.5 mg by mouth daily.  hydrALAZINE (APRESOLINE) 100 mg tablet Take 50 mg by mouth two (2) times a day.          Allergies   Allergen Reactions    Aspirin Other (comments)     Patient told Abdomen is soft. There is no mass.      Tenderness: There is no abdominal tenderness.   Musculoskeletal:         General: No tenderness.      Cervical back: Neck supple.   Lymphadenopathy:      Cervical: No cervical adenopathy.   Skin:     General: Skin is warm.      Findings: No erythema or rash.     Neurological:      Mental Status: Alert and oriented to person, place, and time.      Cranial Nerves: No cranial nerve deficit.   Psychiatric:         Behavior: Calm and cooperative.      Laboratory Results:  Recent Labs   Lab 01/31/24  0500 01/30/24  0533 01/29/24  0522 01/28/24  0613 01/27/24  0614 01/26/24  1059   WBC  --  17.2* 16.8* 18.5*   < > 15.9*   HCT  --  41.4 37.3 36.7   < > 37.5   HGB  --  12.9 11.7* 11.7*   < > 11.5*   PLT  --  341 328 329   < > 281   INR  --   --   --   --   --  1.1   PTT  --   --   --   --   --  35*   SODIUM 142 151* 145 142   < > 141   POTASSIUM 4.2 4.8 3.8 3.7   < > 3.9   CHLORIDE 105 111* 108 106   < > 103   CO2 28 29 27 23   < > 26   GLUCOSE 237* 170* 260* 323*   < > 226*   BUN 92* 93* 82* 71*   < > 42*   CREATININE 2.33* 2.67* 2.73* 2.74*   < > 2.66*    < > = values in this interval not displayed.         XR CHEST AP OR PA   Final Result   FINDINGS/IMPRESSION:      Borderline heart size. Retrocardiac density with silhouetting left   hemidiaphragm and few ill-defined opacities left perihilar area and left   upper lung field relatively unchanged. Few streaky opacities right   perihilar region and medial right lung base also relatively unchanged.       Electronically Signed by: CLINTON MCCOY M.D.    Signed on: 1/27/2024 8:26 AM    Workstation ID: [id number]      XR CHEST PA OR AP 1 VIEW   Final Result   FINDINGS AND IMPRESSION: Limited by portable technique.   The lungs are well expanded. Patchy opacity in the right paratracheal   region above the right pulmonary hilum appears unchanged since the prior   CT, likely chronic atelectasis/scarring left hemidiaphragm is not well  not to take due to abdominal surgeries         Review of Systems - General ROS: negative  Psychological ROS: negative  Respiratory ROS: negative  Cardiovascular ROS: negative  Gastrointestinal ROS: positive for - abdominal pain and nausea/vomiting    Visit Vitals    /51    Pulse (!) 58    Temp 98.1 °F (36.7 °C)    Resp 20    Ht 5' 4\" (1.626 m)    Wt 330 lb (149.7 kg)    SpO2 95%    BMI 56.64 kg/m2         Physical Exam:    Gen:  NAD  Pulm:  Unlabored  Abd:  S/moderately distended/moderate TTP at periumbilical area with unreducible hernia    Recent Results (from the past 24 hour(s))   EKG, 12 LEAD, INITIAL    Collection Time: 01/15/17 10:28 PM   Result Value Ref Range    Ventricular Rate 57 BPM    Atrial Rate 57 BPM    P-R Interval 196 ms    QRS Duration 84 ms    Q-T Interval 418 ms    QTC Calculation (Bezet) 406 ms    Calculated P Axis 88 degrees    Calculated R Axis -10 degrees    Calculated T Axis 27 degrees    Diagnosis       Sinus bradycardia with premature atrial complexes  Nonspecific T wave abnormality  When compared with ECG of 16-MAY-2014 15:06,  premature atrial complexes are now present  Nonspecific T wave abnormality now evident in Inferior leads  Nonspecific T wave abnormality, worse in Lateral leads     CBC WITH AUTOMATED DIFF    Collection Time: 01/15/17 10:30 PM   Result Value Ref Range    WBC 10.3 3.6 - 11.0 K/uL    RBC 4.36 3.80 - 5.20 M/uL    HGB 13.5 11.5 - 16.0 g/dL    HCT 42.2 35.0 - 47.0 %    MCV 96.8 80.0 - 99.0 FL    MCH 31.0 26.0 - 34.0 PG    MCHC 32.0 30.0 - 36.5 g/dL    RDW 13.3 11.5 - 14.5 %    PLATELET 013 116 - 435 K/uL    NEUTROPHILS 59 32 - 75 %    LYMPHOCYTES 32 12 - 49 %    MONOCYTES 7 5 - 13 %    EOSINOPHILS 2 0 - 7 %    BASOPHILS 0 0 - 1 %    ABS. NEUTROPHILS 6.0 1.8 - 8.0 K/UL    ABS. LYMPHOCYTES 3.3 0.8 - 3.5 K/UL    ABS. MONOCYTES 0.7 0.0 - 1.0 K/UL    ABS. EOSINOPHILS 0.2 0.0 - 0.4 K/UL    ABS.  BASOPHILS 0.0 0.0 - 0.1 K/UL   METABOLIC PANEL, COMPREHENSIVE   visualized.. There is no evidence of pneumothorax.  Trachea is midline.    The costophrenic angles are clear.     Cardiomediastinal silhouette is mildly enlarged, likely accentuated by   portable technique visualized osseous structures appear unremarkable except   for partially visualized screw in the left humeral head.      Recommend repeat radiograph with better positioning.         Electronically Signed by: CANDICE ASHBY M.D.    Signed on: 1/26/2024 12:26 PM    Workstation ID: [id number]             Cultures:   Microbiology Results       None             Assessment:   1.  Influenza A infection tested positive as an outpatient.  2.  Acute exacerbation of COPD secondary to #1.  3.  Recent history of COVID.  4.  Possible UTI/cystitis.  Urine cultures showed E. coli ESBL positive.  Blood cultures no growth to date.  4.  Acute hypoxic respiratory failure.  Better.  Now on O2 per nasal cannula.   5.  Metabolic encephalopathy.  6.  Diabetes mellitus.  7.  H/o small cell bronchogenic carcinoma.    Plan:  Patient completed Tamiflu (renally-dosed).  Continue meropenem 1 g q12h x 4 more days.  EOT 2/3/24.  Steroids per pulmonary.  Monitor resp. Status.  Wean O2 as tolerated.  IV midline placement close to discharge.   ID will follow.     Plan as discussed with patient, primary RN, and attending physician Dr. Wesley.    Sally Panchal NP  1/31/2024 12:12 PM   Collection Time: 01/15/17 10:30 PM   Result Value Ref Range    Sodium 139 136 - 145 mmol/L    Potassium 3.3 (L) 3.5 - 5.1 mmol/L    Chloride 106 97 - 108 mmol/L    CO2 25 21 - 32 mmol/L    Anion gap 8 5 - 15 mmol/L    Glucose 83 65 - 100 mg/dL    BUN 10 6 - 20 MG/DL    Creatinine 0.60 0.55 - 1.02 MG/DL    BUN/Creatinine ratio 17 12 - 20      GFR est AA >60 >60 ml/min/1.73m2    GFR est non-AA >60 >60 ml/min/1.73m2    Calcium 8.7 8.5 - 10.1 MG/DL    Bilirubin, total 0.8 0.2 - 1.0 MG/DL    ALT 17 12 - 78 U/L    AST 13 (L) 15 - 37 U/L    Alk. phosphatase 78 45 - 117 U/L    Protein, total 7.2 6.4 - 8.2 g/dL    Albumin 3.1 (L) 3.5 - 5.0 g/dL    Globulin 4.1 (H) 2.0 - 4.0 g/dL    A-G Ratio 0.8 (L) 1.1 - 2.2     PROTHROMBIN TIME + INR    Collection Time: 01/15/17 10:30 PM   Result Value Ref Range    INR 1.0 0.9 - 1.1      Prothrombin time 10.3 9.0 - 11.1 sec   PTT    Collection Time: 01/15/17 10:30 PM   Result Value Ref Range    aPTT 24.0 22.1 - 32.5 sec    aPTT, therapeutic range     58.0 - 77.0 SECS   TYPE & SCREEN    Collection Time: 01/15/17 10:30 PM   Result Value Ref Range    Crossmatch Expiration 01/18/2017     ABO/Rh(D) A POSITIVE     Antibody screen NEG    LACTIC ACID, PLASMA    Collection Time: 01/15/17 10:48 PM   Result Value Ref Range    Lactic acid 1.1 0.4 - 2.0 MMOL/L       AP:  60 yo woman with incarcerated incisional hernia    - Incarcerated hernia:  To OR emergently for open incisional hernia repair with mesh  - risks and benefits explained. Consent obtained.   Proceed to OR

## (undated) DEVICE — CLIP INT SM WIDE RED TI TRNSVRS GRV CHEVRON SHP W PRECIS

## (undated) DEVICE — TRAY CATH OD16FR SIL URIN M STATLOK STBL DEV SURSTP

## (undated) DEVICE — DBD-PACK,LAPAROTOMY,2 REINFORCED GOWNS: Brand: MEDLINE

## (undated) DEVICE — MAGNETIC INSTR DRAPE 20X16: Brand: MEDLINE INDUSTRIES, INC.

## (undated) DEVICE — PROBE 8225101 5PK STD PRASS FL TIP ROHS

## (undated) DEVICE — SUTURE PERMAHAND SZ 2-0 L30IN NONABSORBABLE BLK SILK W/O A305H

## (undated) DEVICE — APPLICATOR MEDICATED 10.5 CC SOLUTION HI LT ORNG CHLORAPREP

## (undated) DEVICE — SLIM BODY SKIN STAPLER: Brand: APPOSE ULC

## (undated) DEVICE — CLIP LIG M BLU TI HRT SHP WIRE HORZ 600 PER BX

## (undated) DEVICE — SUTURE VCRL SZ 3-0 L27IN ABSRB UD L26MM SH 1/2 CIR J416H

## (undated) DEVICE — SKIN TEMPERATURE SENSOR: Brand: DEROYAL

## (undated) DEVICE — KENDALL SCD EXPRESS SLEEVES, KNEE LENGTH, MEDIUM: Brand: KENDALL SCD

## (undated) DEVICE — ABDOMINAL PAD: Brand: DERMACEA

## (undated) DEVICE — SPONGE HEMOSTAT CELLULS 4X8IN -- SURGICEL

## (undated) DEVICE — SPONGE GZ W4XL4IN COT RADPQ HIGHLY ABSRB

## (undated) DEVICE — DEVON™ KNEE AND BODY STRAP 60" X 3" (1.5 M X 7.6 CM): Brand: DEVON

## (undated) DEVICE — TROCAR SITE CLOSURE DEVICE: Brand: ENDO CLOSE

## (undated) DEVICE — SOLUTION IRRIG 1000ML H2O STRL BLT

## (undated) DEVICE — ROCKER SWITCH PENCIL BLADE ELECTRODE, HOLSTER: Brand: EDGE

## (undated) DEVICE — DERMABOND SKIN ADH 0.7ML -- DERMABOND ADVANCED 12/BX

## (undated) DEVICE — GAUZE SPONGES,12 PLY: Brand: CURITY

## (undated) DEVICE — GLOVE ORANGE PI 7 1/2   MSG9075

## (undated) DEVICE — STERILE POLYISOPRENE POWDER-FREE SURGICAL GLOVES WITH EMOLLIENT COATING: Brand: PROTEXIS

## (undated) DEVICE — SURGICAL PROCEDURE PACK BASIN MAJ SET CUST NO CAUT

## (undated) DEVICE — SUTURE PROL SZ 0 L30IN NONABSORBABLE BLU L26MM CT-2 1/2 CIR 8412H

## (undated) DEVICE — 39" SINGLE PATIENT USE HOVERMATT BREATHABLE: Brand: SINGLE PATIENT USE HOVERMATT

## (undated) DEVICE — SPONGE: SPECIALTY PEANUT XR 100/CS: Brand: MEDICAL ACTION INDUSTRIES

## (undated) DEVICE — DRAPE FLD WRM W44XL66IN C6L FOR INTRATEMP SYS THERMABASIN

## (undated) DEVICE — (D)PREP SKN CHLRAPRP APPL 26ML -- CONVERT TO ITEM 371833

## (undated) DEVICE — GOWN,SIRUS,NONRNF,SETINSLV,2XL,18/CS: Brand: MEDLINE

## (undated) DEVICE — SUTURE VCRL SZ 2-0 L27IN ABSRB UD L26MM SH 1/2 CIR J417H

## (undated) DEVICE — Z INACTIVE NO USAGE TURNOVER KIT RM CLEANOP

## (undated) DEVICE — MAJOR LAPAROTOMY-MRMC: Brand: MEDLINE INDUSTRIES, INC.

## (undated) DEVICE — SOLUTION IRRIG 1000ML 0.9% SOD CHL USP POUR PLAS BTL

## (undated) DEVICE — SYR 10ML LUER LOK 1/5ML GRAD --

## (undated) DEVICE — LIGHT HANDLE: Brand: DEVON

## (undated) DEVICE — REM POLYHESIVE ADULT PATIENT RETURN ELECTRODE: Brand: VALLEYLAB

## (undated) DEVICE — SHEET,T,THYROID,STERILE: Brand: MEDLINE

## (undated) DEVICE — HYPODERMIC SAFETY NEEDLE: Brand: MONOJECT

## (undated) DEVICE — SUTURE PDS II SZ 1 L27IN ABSRB VLT CT-1 L36MM 1/2 CIR Z341H

## (undated) DEVICE — SOLUTION IV 1000ML 0.9% SOD CHL

## (undated) DEVICE — KIT,1200CC CANISTER,3/16"X6' TUBING: Brand: MEDLINE INDUSTRIES, INC.

## (undated) DEVICE — CURVED, SMALL JAW, OPEN SEALER/DIVIDER: Brand: LIGASURE

## (undated) DEVICE — INSULATED BLADE ELECTRODE: Brand: EDGE

## (undated) DEVICE — SUT SLK 2-0SH 30IN BLK --

## (undated) DEVICE — TOWEL SURG W17XL27IN STD BLU COT NONFENESTRATED PREWASHED

## (undated) DEVICE — SUTURE VCRL SZ 4-0 L18IN ABSRB UD L13MM P-3 3/8 CIR PRIM J494H

## (undated) DEVICE — SPONGE LAP 18X18IN STRL -- 5/PK

## (undated) DEVICE — SUTURE PDS II SZ 1 L96IN ABSRB VLT TP-1 L65MM 1/2 CIR Z880G

## (undated) DEVICE — Z INACTIVE USE 2240337 DRAPE SURG PT TRANSFER TRAWAY SHT